# Patient Record
Sex: FEMALE | Race: WHITE | Employment: UNEMPLOYED | URBAN - METROPOLITAN AREA
[De-identification: names, ages, dates, MRNs, and addresses within clinical notes are randomized per-mention and may not be internally consistent; named-entity substitution may affect disease eponyms.]

---

## 2022-01-06 ENCOUNTER — OFFICE VISIT (OUTPATIENT)
Dept: FAMILY MEDICINE CLINIC | Age: 22
End: 2022-01-06
Payer: COMMERCIAL

## 2022-01-06 VITALS
HEIGHT: 66 IN | WEIGHT: 228 LBS | SYSTOLIC BLOOD PRESSURE: 126 MMHG | BODY MASS INDEX: 36.64 KG/M2 | HEART RATE: 85 BPM | DIASTOLIC BLOOD PRESSURE: 78 MMHG

## 2022-01-06 DIAGNOSIS — Z87.768 H/O DEVELOPMENTAL DYSPLASIA OF THE HIP: ICD-10-CM

## 2022-01-06 DIAGNOSIS — N39.0 URINARY TRACT INFECTION WITHOUT HEMATURIA, SITE UNSPECIFIED: Primary | ICD-10-CM

## 2022-01-06 DIAGNOSIS — G89.29 HIP PAIN, CHRONIC, LEFT: ICD-10-CM

## 2022-01-06 DIAGNOSIS — M25.552 HIP PAIN, CHRONIC, LEFT: ICD-10-CM

## 2022-01-06 LAB
BILIRUBIN, POC: NORMAL
BLOOD URINE, POC: NORMAL
CLARITY, POC: CLEAR
COLOR, POC: YELLOW
GLUCOSE URINE, POC: NORMAL
KETONES, POC: NORMAL
LEUKOCYTE EST, POC: NORMAL
NITRITE, POC: NORMAL
PH, POC: 6
PROTEIN, POC: NORMAL
SPECIFIC GRAVITY, POC: 1030
UROBILINOGEN, POC: NORMAL

## 2022-01-06 PROCEDURE — G8427 DOCREV CUR MEDS BY ELIG CLIN: HCPCS | Performed by: NURSE PRACTITIONER

## 2022-01-06 PROCEDURE — G8417 CALC BMI ABV UP PARAM F/U: HCPCS | Performed by: NURSE PRACTITIONER

## 2022-01-06 PROCEDURE — 81003 URINALYSIS AUTO W/O SCOPE: CPT | Performed by: NURSE PRACTITIONER

## 2022-01-06 PROCEDURE — G8484 FLU IMMUNIZE NO ADMIN: HCPCS | Performed by: NURSE PRACTITIONER

## 2022-01-06 PROCEDURE — 99203 OFFICE O/P NEW LOW 30 MIN: CPT | Performed by: NURSE PRACTITIONER

## 2022-01-06 PROCEDURE — 1036F TOBACCO NON-USER: CPT | Performed by: NURSE PRACTITIONER

## 2022-01-06 SDOH — ECONOMIC STABILITY: FOOD INSECURITY: WITHIN THE PAST 12 MONTHS, YOU WORRIED THAT YOUR FOOD WOULD RUN OUT BEFORE YOU GOT MONEY TO BUY MORE.: NEVER TRUE

## 2022-01-06 SDOH — ECONOMIC STABILITY: FOOD INSECURITY: WITHIN THE PAST 12 MONTHS, THE FOOD YOU BOUGHT JUST DIDN'T LAST AND YOU DIDN'T HAVE MONEY TO GET MORE.: NEVER TRUE

## 2022-01-06 ASSESSMENT — PATIENT HEALTH QUESTIONNAIRE - PHQ9
SUM OF ALL RESPONSES TO PHQ QUESTIONS 1-9: 0
SUM OF ALL RESPONSES TO PHQ9 QUESTIONS 1 & 2: 0
SUM OF ALL RESPONSES TO PHQ QUESTIONS 1-9: 0
2. FEELING DOWN, DEPRESSED OR HOPELESS: 0
SUM OF ALL RESPONSES TO PHQ QUESTIONS 1-9: 0
1. LITTLE INTEREST OR PLEASURE IN DOING THINGS: 0
SUM OF ALL RESPONSES TO PHQ QUESTIONS 1-9: 0

## 2022-01-06 ASSESSMENT — SOCIAL DETERMINANTS OF HEALTH (SDOH): HOW HARD IS IT FOR YOU TO PAY FOR THE VERY BASICS LIKE FOOD, HOUSING, MEDICAL CARE, AND HEATING?: NOT HARD AT ALL

## 2022-01-07 DIAGNOSIS — G89.29 HIP PAIN, CHRONIC, LEFT: Primary | ICD-10-CM

## 2022-01-07 DIAGNOSIS — M25.552 HIP PAIN, CHRONIC, LEFT: Primary | ICD-10-CM

## 2022-01-11 ASSESSMENT — ENCOUNTER SYMPTOMS
VOMITING: 0
NAUSEA: 0

## 2022-01-11 NOTE — PROGRESS NOTES
Subjective  Tommye Kehr, 24 y.o. female presents today with:  Chief Complaint   Patient presents with   Murrel Neither Established New Doctor    Hip Pain     left     Urinary Tract Infection        Has a history of hip dysplasia as a child she states she is having chronic pain in her left hip would like it evaluated and would like an Ortho consult    Urinary Tract Infection   This is a new problem. The current episode started in the past 7 days. The problem occurs every urination. The problem has been unchanged. The quality of the pain is described as burning. There has been no fever. She is sexually active. There is no history of pyelonephritis. Pertinent negatives include no chills, discharge, flank pain, frequency, hematuria, hesitancy, nausea, possible pregnancy, sweats, urgency or vomiting. She has tried nothing for the symptoms. The treatment provided no relief. Review of Systems   Constitutional: Negative for chills. Gastrointestinal: Negative for nausea and vomiting. Genitourinary: Negative for flank pain, frequency, hematuria, hesitancy and urgency. No past medical history on file. No past surgical history on file.   Social History     Socioeconomic History    Marital status: Single     Spouse name: Not on file    Number of children: Not on file    Years of education: Not on file    Highest education level: Not on file   Occupational History    Not on file   Tobacco Use    Smoking status: Never Smoker    Smokeless tobacco: Never Used   Vaping Use    Vaping Use: Never used   Substance and Sexual Activity    Alcohol use: Never    Drug use: Never    Sexual activity: Not on file   Other Topics Concern    Not on file   Social History Narrative    Not on file     Social Determinants of Health     Financial Resource Strain: Low Risk     Difficulty of Paying Living Expenses: Not hard at all   Food Insecurity: No Food Insecurity    Worried About [address] [employer] in the Last Year: Never true    Xiomara of Food in the Last Year: Never true   Transportation Needs:     Lack of Transportation (Medical): Not on file    Lack of Transportation (Non-Medical): Not on file   Physical Activity:     Days of Exercise per Week: Not on file    Minutes of Exercise per Session: Not on file   Stress:     Feeling of Stress : Not on file   Social Connections:     Frequency of Communication with Friends and Family: Not on file    Frequency of Social Gatherings with Friends and Family: Not on file    Attends Alevism Services: Not on file    Active Member of [address] [place] [address] [employer] or Organizations: Not on file    Attends Club or Organization Meetings: Not on file    Marital Status: Not on file   Intimate Partner Violence:     Fear of Current or Ex-Partner: Not on file    Emotionally Abused: Not on file    Physically Abused: Not on file    Sexually Abused: Not on file   Housing Stability:     Unable to Pay for Housing in the Last Year: Not on file    Number of [place] in the Last Year: Not on file    Unstable Housing in the Last Year: Not on file     No family history on file. No Known Allergies  No current outpatient medications on file. No current facility-administered medications for this visit. PMH, Surgical Hx, Family Hx, and Social Hx reviewed and updated. Health Maintenance reviewed. Objective    Vitals:    01/06/22 1535   BP: 126/78   Pulse: 85   Weight: 228 lb (103.4 kg)   Height: 5' 6\" (1.676 m)       Physical Exam  Constitutional:       General: She is not in acute distress. Appearance: She is well-developed. HENT:      Head: Normocephalic and atraumatic. Right Ear: External ear normal.      Left Ear: External ear normal.      Nose: Nose normal.   Eyes:      Conjunctiva/sclera: Conjunctivae normal.      Pupils: Pupils are equal, round, and reactive to light. Neck:      Vascular: No JVD. Cardiovascular:      Rate and Rhythm: Normal rate and regular rhythm.       Heart sounds: Normal heart sounds. No murmur heard. Pulmonary:      Effort: Pulmonary effort is normal. No respiratory distress. Breath sounds: Normal breath sounds. No wheezing or rales. Abdominal:      General: Bowel sounds are normal. There is no distension. Palpations: Abdomen is soft. There is no mass. Tenderness: There is no abdominal tenderness. Musculoskeletal:         General: Tenderness present. No swelling, deformity or signs of injury. Cervical back: Neck supple. Right lower leg: No edema. Left lower leg: No edema. Skin:     General: Skin is warm and dry. Neurological:      Mental Status: She is alert and oriented to person, place, and time. Assessment & Plan   Oh Griffin was seen today for established new doctor, hip pain and urinary tract infection. Diagnoses and all orders for this visit:    Urinary tract infection without hematuria, site unspecified  -     POCT Urinalysis No Micro (Auto)    H/O developmental dysplasia of the hip  -     XR HIP LEFT (2-3 VIEWS); Future  -     Funmilayo Herring MD, Orthopaedic Surgery [facility]    Hip pain, chronic, left  -     XR HIP LEFT (2-3 VIEWS); Future  -     Funmilayo Herring MD, Orthopaedic Surgery [facility]      Orders Placed This Encounter   Procedures    XR HIP LEFT (2-3 VIEWS)     Standing Status:   Future     Number of Occurrences:   1     Standing Expiration Date:   1/6/2023   Funmilayo Herring MD, Orthopaedic Surgery [facility]     Referral Priority:   Routine     Referral Type:   Eval and Treat     Referral Reason:   Specialty Services Required     Referred to Provider:   Brandon Lara MD     Requested Specialty:   Orthopedic Surgery     Number of Visits Requested:   1    POCT Urinalysis No Micro (Auto)     No orders of the defined types were placed in this encounter. There are no discontinued medications. No follow-ups on file.       Reviewed with the patient: current clinical status, medications, activities and diet. Side effects, adverse effects of the medication prescribed today, as well as treatment plan/ rationale and result expectations have been discussed with the patient who expresses understanding and desires to proceed. Close follow up to evaluate treatment results and for coordination of care. I have reviewed the patient's medical history in detail and updated the computerized patient record.     Taras Soriano, APRN - CNP

## 2022-01-12 ENCOUNTER — TELEPHONE (OUTPATIENT)
Dept: FAMILY MEDICINE CLINIC | Age: 22
End: 2022-01-12

## 2022-01-12 NOTE — TELEPHONE ENCOUNTER
----- Message from Gia Sampson sent at 1/12/2022  2:05 PM EST -----  Subject: Message to Provider    QUESTIONS  Information for Provider? Patient would like to know why Physical Therapy   keeps calling her? I'm assuming she is unaware of Dr Moni Gamboa   referring her to PT. Please call patient back with information, mom called   for her but is not on HIPPA   ---------------------------------------------------------------------------  --------------  CALL BACK INFO  What is the best way for the office to contact you? OK to leave message on   voicemail  Preferred Call Back Phone Number? [phone]  ---------------------------------------------------------------------------  --------------  SCRIPT ANSWERS  Relationship to Patient?  Self

## 2022-01-13 NOTE — TELEPHONE ENCOUNTER
For her left hip pain the x-ray was normal and she says has been going on for months please call and let her know  why physical therapy is calling her

## 2022-01-17 ENCOUNTER — TELEPHONE (OUTPATIENT)
Dept: FAMILY MEDICINE CLINIC | Age: 22
End: 2022-01-17

## 2022-01-17 NOTE — TELEPHONE ENCOUNTER
----- Message from Yudelka Golden sent at 1/11/2022  4:25 PM EST -----  Subject: Message to Provider    QUESTIONS  Information for Provider? Pt is returning a missed results call from   [facility], pls give her a call.  ---------------------------------------------------------------------------  --------------  CALL BACK INFO  What is the best way for the office to contact you? OK to leave message on   voicemail  Preferred Call Back Phone Number?  [phone]  ---------------------------------------------------------------------------  --------------  SCRIPT ANSWERS  undefined

## 2022-01-18 ENCOUNTER — HOSPITAL ENCOUNTER (OUTPATIENT)
Dept: PHYSICAL THERAPY | Age: 22
Setting detail: THERAPIES SERIES
Discharge: HOME | End: 2022-01-18
Payer: COMMERCIAL

## 2022-01-18 PROCEDURE — 97110 THERAPEUTIC EXERCISES: CPT

## 2022-01-18 PROCEDURE — 97161 PT EVAL LOW COMPLEX 20 MIN: CPT

## 2022-01-18 ASSESSMENT — PAIN DESCRIPTION - LOCATION: LOCATION: HIP

## 2022-01-18 ASSESSMENT — PAIN DESCRIPTION - DIRECTION: RADIATING_TOWARDS: DENIES

## 2022-01-18 ASSESSMENT — PAIN DESCRIPTION - FREQUENCY: FREQUENCY: INTERMITTENT

## 2022-01-18 ASSESSMENT — PAIN SCALES - GENERAL: PAINLEVEL_OUTOF10: 0

## 2022-01-18 ASSESSMENT — PAIN DESCRIPTION - ORIENTATION: ORIENTATION: ANTERIOR;LEFT

## 2022-01-18 NOTE — PROGRESS NOTES
[address]  PHYSICAL THERAPY EVALUATION    Date: 2022  Patient Name: Kemal Blackburn       MRN: [id number]   Account: [de-identified]   : 2000  (24 y.o.)   Gender: female   Referring Practitioner: Martha PACHECO                 Diagnosis: hip pain, chronic left  Treatment Diagnosis: decreased hip ROM, decreased hip strength, decreased activity tolerance for running and amb prolonged distances >1 mile and increased pain L anterior hip with transferring off the floor  Additional Pertinent Hx: hip dysplasia surgery in         Past Medical History:  has no past medical history on file. Past Surgical History:   has no past surgical history on file. Vital Signs  Patient Currently in Pain: Denies   Pain Screening  Patient Currently in Pain: Denies  Pain Assessment  Pain Assessment: 0-10  Pain Level: 0 (pain range 0-8/10)  Pain Location: Hip (ASIS)  Pain Orientation: Anterior; Left  Pain Radiating Towards: denies  Pain Descriptors:  (\"tearing\")  Pain Frequency: Intermittent     Lives With: Family  Active : Yes  Occupation: Student  Type of occupation: [employer]     Subjective:  Subjective: Pt to PT due to continued L hip pain- pain increases when getting up from the ground, walking 5K, and running for 1 mile- pain lasts for 5 min after activity. Pain does not wake pt at night. Pt reports no use of pain meds. Pt states some decreased pain with stretching (lunging) before physical activity. Comments: RTD 22; L hip x-ray 2022: Bony pelvis intact without acute fracture. No hip fracture. Hip joint spaces maintained. SI joints maintained. Pubic symphysis is intact. Visualized lower lumbar spine unremarkable. Soft tissues unremarkable. No other significant abnormality.     Objective:   Sensation  Overall Sensation Status: WFL    Ambulation 1  Surface: carpet  Device: No Device  Assistance: Independent  Quality of Gait: toe in on right  Stairs  # Steps : 4  Device: No Device  Assistance: Independent  Comment: no deficits with stair negotiation    Strength RLE  Strength RLE: WNL  Strength LLE  Strength LLE: Exception  L Hip Flexion: 4/5 (pain provocation)  L Hip Extension: 4+/5  L Hip ABduction: 4/5  L Hip ADduction: 5/5  L Hip Internal Rotation: 4/5  L Hip External Rotation: 4/5  L Knee Flexion: 5/5  L Knee Extension: 5/5  L Ankle Dorsiflexion: 5/5     AROM RLE (degrees)  RLE AROM: Exceptions  RLE General AROM: knee/ ankle WNL  R Hip Flexion 0-125: 90  R Hip Extension 0-10: 10  R Hip ABduction 0-45: 45  R Hip ADduction 0-10: 10  R Hip External Rotation 0-45: 35  R Hip Internal Rotation 0-45: 35     AROM LLE (degrees)  LLE AROM : Exceptions  LLE General AROM: knee/ ankle WNL  L Hip Flexion 0-125: 80  L Hip Extension 0-10: 10  L Hip ABduction 0-45: 35  L Hip ADduction 0-10: 10  L Hip External Rotation 0-45: 35  L Hip Internal Rotation 0-45: 40     Observation/Palpation  Posture: Good  Palpation: no pain with palpation  Observation: no difficulty or pain transferring off the ground today    Additional Measures  Flexibility: hamstring flexibility 90/90 R/L: -30 deg  Special Tests: L Hip: SCOUR(-), FADIR(-), YASMEEN(-), LUZMARIA's(-), GRACE(+)    Exercises:   Exercises  Exercise 1: isometric psoas 10 sec X 10  Exercise 2: half kneeling hip flexor stretch 10 sec X 3 R/L  Exercise 3: prone bend knee fallout*  Exercise 4: seated hip IR/ER with TB/ progress to SLS*  Exercise 5: s/l hip abd/ clam/ reverse clam*  Exercise 6: frogger walk*  Exercise 7: hamstring stretch*  Exercise 8: hip ER stretch*  Exercise 9: bridge*  Exercise 10: isometric hip flexion*  Exercise 11: DKTC*  Exercise 20: HEP: isometric psoas, half kneel lunge, use of ice or heat daily, self massage L hip flexor daily     Modalities:  Modalities  Moist heat: *  Cryotherapy (Minutes\Location):  *     Manual:  Manual therapy  PROM: *  Soft Tissue Mobalization: *  *Indicates exercise,modality, or manual techniques to be initiated when appropriate    Assessment: Body structures, Functions, Activity limitations: Decreased functional mobility ,Decreased endurance,Decreased ROM,Increased pain,Decreased strength  Assessment: Pt is 24 y.o. female to PT due to chronic L anterior hip pain- pain is only with certain movements. Pt with history of L hip dysplagia surgery in 2000. Pt exhibits impairments including decreased hip ROM, decreased hip strength, decreased activity tolerance for running and amb prolonged distances >1 mile and increased pain L anterior hip with transferring off the floor. Pt requires continued PT to address these impairments, progress strength and ROM, and provide pt with HEP for self management of pain. Prognosis: Good  Discharge Recommendations: Continue to assess pending progress     Decision Making: Low Complexity  History: low- hip dysplasia surgery in 2000  Exam: med- LEFS 63/80  Clinical Presentation: stable- low    Plan  Frequency/Duration:  Plan  Times per week: 1  Plan weeks: 4-6  Current Treatment Recommendations: Strengthening,Neuromuscular Re-education,Home Exercise Program,ROM,Integrated Dry Needling,Manual Therapy - Soft Tissue Mobilization,Safety Education & Training,Endurance Training,Patient/Caregiver Education & Training,Functional Mobility Training,Equipment Evaluation, Education, & procurement,Modalities,Pain [address]    Patient Education  New Education Provided: PT Education: Goals;PT Role;Plan of Care;Home Exercise Program    POST-PAIN     Pain Rating (0-10 pain scale):   0/10  Location and pain description same as pre-treatment unless indicated. Action: [x] NA  [] Call Physician  [] Perform HEP  [] Meds as prescribed    Evaluation and patient rights have been reviewed and patient agrees with plan of care.   Yes  [x]  No  []   Explain:       Isamar Fall Risk Assessment  Risk Factor Scale  Score   History of Falls [] Yes  [x] No 25  0 0   Secondary Diagnosis [] Yes  [x] No 15  0 0   Ambulatory Aid [] Furniture  [] Crutches/cane/walker  [x] None/bedrest/wheelchair/nurse 30  15  0 0   IV/Heparin Lock [] Yes  [x] No 20  0 0   Gait/Transferring [] Impaired  [] Weak  [x] Normal/bedrest/immobile 20  10  0 0   Mental Status [] Forgets limitations  [x] Oriented to own ability 15  0 0      Total:0     Based on the Assessment score: check the appropriate box. [x]  No intervention needed   Low =   Score of 0-24  []  Use standard prevention interventions Moderate =  Score of 24-44   [] Discuss fall prevention strategies   [] Indicate moderate falls risk on eval  []  Use high risk prevention interventions High = Score of 45 and higher   [] Discuss fall prevention strategies   [] Provide supervision during treatment time    Goals  Short term goals  Time Frame for Short term goals: 2 wks  Short term goal 1: Pt will demonstrate improved L hip AROM WNL for carryover to decreased anterior hip pain. Long term goals  Time Frame for Long term goals : 6 wks  Long term goal 1: Pt will demonstrate indep and compliance with % of the time for self management of L hip pain. Long term goal 2: Pt will demonstrate improved score on LEFS >/= 75/80 for improved pt quality of life. Long term goal 3: The pt will report decreased pain </=1/10 at worst in order to increase ease with running and amb > 1 mile and decresed pain with transfers off the floor.     PT Individual Minutes  Time In: 0806  Time Out: 2811  Minutes: 49  Timed Code Treatment Minutes: 0 Minutes  Procedure Minutes: eval X  49 min     Electronically signed by Umer Valverde PT on 1/18/22 at 12:38 PM EST

## 2022-01-18 NOTE — PROGRESS NOTES
[address]     Ph: [phone]  Fax: [phone]    [] Certification  [] Recertification [x]  Plan of Care  [] Progress Note [] Discharge      To:  Surya Sequeiraming APRN-CNP      From:  Clover Sprague, PT  Patient: Calvin Keys     : 2000  Diagnosis: hip pain, chronic left     Date: 2022  Treatment Diagnosis: decreased hip ROM, decreased hip strength, decreased activity tolerance for running and amb prolonged distances >1 mile and increased pain L anterior hip with transferring off the floor       Progress Report Period from:  2022  to 2022    Total # of Visits to Date: 1   No Show: 0    Canceled Appointment: 0     OBJECTIVE:   Short Term Goals - Time Frame for Short term goals: 2 wks    Goals Current/Discharge status  Met   Short term goal 1: Pt will demonstrate improved L hip AROM WNL for carryover to decreased anterior hip pain. AROM RLE (degrees)  RLE AROM: Exceptions  RLE General AROM: knee/ ankle WNL  R Hip Flexion 0-125: 90  R Hip Extension 0-10: 10  R Hip ABduction 0-45: 45  R Hip ADduction 0-10: 10  R Hip External Rotation 0-45: 35  R Hip Internal Rotation 0-45: 35     AROM LLE (degrees)  LLE AROM : Exceptions  LLE General AROM: knee/ ankle WNL  L Hip Flexion 0-125: 80  L Hip Extension 0-10: 10  L Hip ABduction 0-45: 35  L Hip ADduction 0-10: 10  L Hip External Rotation 0-45: 35  L Hip Internal Rotation 0-45: 40     [] yes  [x] no     Long Term Goals - Time Frame for Long term goals : 6 wks  Goals Current/ Discharge status Met   Long term goal 1: Pt will demonstrate indep and compliance with % of the time for self management of L hip pain. HEP initiated [] yes  [x] no   Long term goal 2: Pt will demonstrate improved score on LEFS >/= 75/80 for improved pt quality of life.  LEFS 63/80   [] yes  [x] no   Long term goal 3: The pt will report decreased pain </=1/10 at worst in order to increase ease with running and amb > 1 mile and decresed pain with transfers off the floor. Pain Location: Hip (ASIS)    Pain Level: 0 (pain range 0-8/10)    Pain Descriptors:  (\"tearing\")   [] yes  [x] no     Body structures, Functions, Activity limitations: Decreased functional mobility ,Decreased endurance,Decreased ROM,Increased pain,Decreased strength  Assessment: Pt is 24 y.o. female to PT due to chronic L anterior hip pain- pain is only with certain movements. Pt with history of L hip dysplagia surgery in 2000. Pt exhibits impairments including decreased hip ROM, decreased hip strength, decreased activity tolerance for running and amb prolonged distances >1 mile and increased pain L anterior hip with transferring off the floor. Pt requires continued PT to address these impairments, progress strength and ROM, and provide pt with HEP for self management of pain. Prognosis: Good  Discharge Recommendations: Continue to assess pending progress      PT Education: Goals;PT Role;Plan of Care;Home Exercise Program    PLAN: [x] Evaluate and Treat  Frequency/Duration:  Plan  Times per week: 1  Plan weeks: 4-6  Current Treatment Recommendations: Strengthening,Neuromuscular Re-education,Home Exercise Program,ROM,Integrated Dry Needling,Manual Therapy - Soft Tissue Mobilization,Safety Education & Training,Endurance Training,Patient/Caregiver Education & Training,Functional Mobility Training,Equipment Evaluation, Education, & procurement,Modalities,Pain [address] [facility]                  Patient Status:[x] Continue/ Initiate plan of Care    [] Discharge PT. Recommend pt continue with HEP. [] Additional visits requested, Please re-certify for additional visits:          Signature: Electronically signed by Era Hernandez PT on 1/18/22 at 12:40 PM EST      If you have any questions or concerns, please don't hesitate to call.   Thank you for your referral.    I have reviewed this plan of care and certify a need for medically necessary rehabilitation services.     Physician Signature:__________________________________________________________  Date:  Please sign and return

## 2022-01-24 ENCOUNTER — HOSPITAL ENCOUNTER (OUTPATIENT)
Dept: PHYSICAL THERAPY | Age: 22
Setting detail: THERAPIES SERIES
Discharge: HOME | End: 2022-01-24
Payer: COMMERCIAL

## 2022-01-24 PROCEDURE — 97110 THERAPEUTIC EXERCISES: CPT

## 2022-01-24 PROCEDURE — 97140 MANUAL THERAPY 1/> REGIONS: CPT

## 2022-01-24 ASSESSMENT — PAIN SCALES - GENERAL: PAINLEVEL_OUTOF10: 0

## 2022-01-24 NOTE — PROGRESS NOTES
[facility]   Outpatient Physical Therapy   Treatment Note  [] [address]  [x] [facility]  Date: 2022  Patient: Dipti Lomas  : 2000  ACCT #: [de-identified]  Referring Practitioner: Chris PACHECO  Diagnosis: hip pain, chronic left        Visit Information:  PT Visit Information  PT Insurance Information: [employer]  Total # of Visits Approved: 8 (eval only)  Total # of Visits to Date: 2  No Show: 0  Canceled Appointment: 0  Progress Note Counter:  (24 units through 3/10/22)    SUBJECTIVE:   Subjective  Subjective: Pt reports that hip is feelin looser. HEP Compliance:  [x] Good [] Fair [] Poor [] Reports not doing due to:    PAIN   Location:      Pain Rating (0-10 pain scale):  Pain Level: 0  Pain Description:     Action:  [] Acceptable for treatment  []  Other:    OBJECTIVE:   Exercises  Exercise 1: isometric psoas 10 sec X 10  Exercise 2: half kneeling hip flexor stretch 10 sec X 3 R/L  Exercise 5:  clam/ reverse clam x1  Exercise 7: hamstring stretch step   Exercise 8: hip ER stretch [id number]  Exercise 9: bridge x10  Exercise 11: sKTC 3 x30 sec   Exercise 20: HEP clamshells sktc    Manual: []  NA   Manual therapy  Soft Tissue Mobalization: tennis ball massage hip flexor      HEP  Continue with current Home Exercise Program.  See Objective section for progression of HEP. Comments:       POST-PAIN    Pain Rating (0-10 pain scale): 0/10  Location and Pain Description same as pre-pain unless otherwise indicated. Action: [] NA  [] Call Physician  [x] Perform HEP  [] Meds as prescribed     ASSESSMENT:     Conditions Requiring Skilled Therapeutic Intervention  Body structures, Functions, Activity limitations: Decreased functional mobility ,Decreased endurance,Decreased ROM,Increased pain,Decreased strength  Assessment: Pt with some increased difficulty with the clamshells. Pt with overall good alberto to stretches. Tolerance to treatment:  [x] Good   [] Fair   [] Poor  [] Fatigued   [] Increased pain   Limited by:    Goals:     Short term goals  Time Frame for Short term goals: 2 wks  Short term goal 1: Pt will demonstrate improved L hip AROM WNL for carryover to decreased anterior hip pain. Long term goals  Time Frame for Long term goals : 6 wks  Long term goal 1: Pt will demonstrate indep and compliance with % of the time for self management of L hip pain. Long term goal 2: Pt will demonstrate improved score on LEFS >/= 75/80 for improved pt quality of life. Long term goal 3: The pt will report decreased pain </=1/10 at worst in order to increase ease with running and amb > 1 mile and decresed pain with transfers off the floor. Progress toward goals:tbd  Goals Met:    []  See updated POC   Comments:    PLAN:  [x] Continue POC to pt tolerance  [] Hold PT for ___ days.   See note to physician  [] Discharge PT    Signature:   Electronically signed by Yimi Kay PTA on 1/24/22 at 5:21 PM EST    PT Individual Minutes  Time In: 9722  Time Out: 6204  Minutes: 42  Timed Code Treatment Minutes: 42 Minutes    Activity Minutes Units   Ther Ex 32 2   Manual   10 1

## 2022-02-03 ENCOUNTER — HOSPITAL ENCOUNTER (OUTPATIENT)
Dept: PHYSICAL THERAPY | Age: 22
Setting detail: THERAPIES SERIES
Discharge: HOME | End: 2022-02-03
Payer: COMMERCIAL

## 2022-02-03 NOTE — PROGRESS NOTES
[address]       Physical Therapy  Cancellation/No-show Note  Patient Name:  Carisa Hunter  :  2000   Date:  2/3/2022  Referring Practitioner: Tierra PACHECO  Diagnosis: hip pain, chronic left    Visit Information:  PT Visit Information  PT Insurance Information: [employer]  Total # of Visits Approved: 8 (eval only)  Total # of Visits to Date: 2  No Show: 0  Canceled Appointment: 1  Progress Note Counter:  (24 units through 3/10/22) Cx 2/3/22    For today's appointment patient:  [x]  Cancelled  []  Rescheduled appointment  []  No-show   []  Called pt to remind of next appointment     Reason given by patient:  []  Patient ill  []  Conflicting appointment  []  No transportation    []  Conflict with work  []  No reason given  [x]  Other:   weather     Comments:       Signature: Electronically signed by Felipe Romero PTA on 2/3/22 at 10:38 AM EST

## 2022-02-07 ENCOUNTER — HOSPITAL ENCOUNTER (OUTPATIENT)
Dept: PHYSICAL THERAPY | Age: 22
Setting detail: THERAPIES SERIES
Discharge: HOME | End: 2022-02-07
Payer: COMMERCIAL

## 2022-02-07 PROCEDURE — 97110 THERAPEUTIC EXERCISES: CPT

## 2022-02-07 PROCEDURE — 97140 MANUAL THERAPY 1/> REGIONS: CPT

## 2022-02-07 ASSESSMENT — PAIN SCALES - GENERAL: PAINLEVEL_OUTOF10: 0

## 2022-02-07 NOTE — PROGRESS NOTES
[facility]   Outpatient Physical Therapy   Treatment Note  [] [address]  [x] [facility] CENTER            of [address]  Date: 2022  Patient: Dipti Lomas  : 2000  ACCT #: [de-identified]  Referring Practitioner: Chris PACHECO  Diagnosis: hip pain, chronic left  Treatment Diagnosis: decreased hip ROM, decreased hip strength, decreased activity tolerance for running and amb prolonged distances >1 mile and increased pain L anterior hip with transferring off the floor     Visit Information:  PT Visit Information  PT Insurance Information: [employer]  Total # of Visits Approved: 8 (eval only)  Total # of Visits to Date: 3  No Show: 0  Canceled Appointment: 1  Progress Note Counter: 3/7 (24 units through 3/10/22) Cx 2/3/22    SUBJECTIVE:   Subjective  Subjective: Pt reports that hip as been feeling pretty good. HEP Compliance:  [x] Good [] Fair [] Poor [] Reports not doing due to:    PAIN   Location:      Pain Rating (0-10 pain scale):  Pain Level: 0  Pain Description:     Action:  [] Acceptable for treatment  []  Other:    OBJECTIVE:   Exercises  Exercise 4: seated hip IR/ER with YTB   Exercise 5:  clam/ reverse clam x15  Exercise 6: piriformis stretch 3x30 sec   Exercise 7: hamstring stretch step   Exercise 8: hip ER stretch [id number]  Exercise 9: bridge x10  Exercise 11: sKTC 3 x30 sec   Exercise 20: HEP hamstring stretch standing    Manual: []  NA   Manual therapy  PROM: PROM chavez hips  Soft Tissue Mobalization: getly lt le leg pulls     HEP  Continue with current Home Exercise Program.  See Objective section for progression of HEP. Comments:       POST-PAIN    Pain Rating (0-10 pain scale): 0/10  Location and Pain Description same as pre-pain unless otherwise indicated.   Action: [] NA  [] Call Physician  [x] Perform HEP  [] Meds as prescribed     ASSESSMENT:     Conditions Requiring Skilled Therapeutic Intervention  Body structures, Functions, Activity limitations: Decreased functional mobility ,Decreased endurance,Decreased ROM,Increased pain,Decreased strength  Assessment: Pt with some increased pain when coming out of the piriformis stretch. Pt with increased difficulty with hip internal rotation. Treatment Diagnosis: decreased hip ROM, decreased hip strength, decreased activity tolerance for running and amb prolonged distances >1 mile and increased pain L anterior hip with transferring off the floor        Tolerance to treatment:  [x] Good   [] Fair   [] Poor  [] Fatigued   [] Increased pain   Limited by:    Goals:     Short term goals  Time Frame for Short term goals: 2 wks  Short term goal 1: Pt will demonstrate improved L hip AROM WNL for carryover to decreased anterior hip pain. Long term goals  Time Frame for Long term goals : 6 wks  Long term goal 1: Pt will demonstrate indep and compliance with % of the time for self management of L hip pain. Long term goal 2: Pt will demonstrate improved score on LEFS >/= 75/80 for improved pt quality of life. Long term goal 3: The pt will report decreased pain </=1/10 at worst in order to increase ease with running and amb > 1 mile and decresed pain with transfers off the floor. Progress toward goals: rom  Goals Met:    []  See updated POC   Comments:    PLAN:  [x] Continue POC to pt tolerance  [] Hold PT for ___ days.   See note to physician  [] Discharge PT    Signature:   Electronically signed by Tay Barron PTA on 2/7/22 at 5:10 PM EST    PT Individual Minutes  Time In: 5391  Time Out: 0354  Minutes: 41  Timed Code Treatment Minutes: 41 Minutes    Activity Minutes Units   Ther Ex 31 2   Manual   10  1

## 2022-02-14 ENCOUNTER — HOSPITAL ENCOUNTER (OUTPATIENT)
Dept: PHYSICAL THERAPY | Age: 22
Setting detail: THERAPIES SERIES
Discharge: HOME | End: 2022-02-14
Payer: COMMERCIAL

## 2022-02-14 PROCEDURE — 97110 THERAPEUTIC EXERCISES: CPT

## 2022-02-14 PROCEDURE — 97140 MANUAL THERAPY 1/> REGIONS: CPT

## 2022-02-14 ASSESSMENT — PAIN SCALES - GENERAL: PAINLEVEL_OUTOF10: 0

## 2022-02-14 NOTE — PROGRESS NOTES
[facility]   Outpatient Physical Therapy   Treatment Note  [] [address]  [x] [facility]  Date: 2022  Patient: Guanako Sutherland  : 2000  ACCT #: [de-identified]  Referring Practitioner: Yaneth PACHECO  Diagnosis: hip pain, chronic left        Visit Information:  PT Visit Information  PT Insurance Information: [facility]  Total # of Visits Approved: 8 (eval only)  Total # of Visits to Date: 4  No Show: 0  Canceled Appointment: 1  Progress Note Counter:  (24 units through 3/10/22     SUBJECTIVE:   Subjective  Subjective: pt reports that she felt good after last visit. HEP Compliance:  [x] Good [] Fair [] Poor [] Reports not doing due to:    PAIN   Location:      Pain Rating (0-10 pain scale):  Pain Level: 0  Pain Description:     Action:  [] Acceptable for treatment  []  Other:    OBJECTIVE:   Exercises  Exercise 5:  clam/ reverse clam x15  Exercise 6: piriformis stretch 3x30 sec   Exercise 7: hamstring stretch step   Exercise 9: bridge x10  Exercise 11: sKTC 3 x30 sec     Manual: []  NA   Manual therapy  PROM: PROM LT hip      HEP  Continue with current Home Exercise Program.  See Objective section for progression of HEP. Comments:       POST-PAIN    Pain Rating (0-10 pain scale): 0/10  Location and Pain Description same as pre-pain unless otherwise indicated. Action: [] NA  [] Call Physician  [x] Perform HEP  [] Meds as prescribed     ASSESSMENT:     Conditions Requiring Skilled Therapeutic Intervention  Body structures, Functions, Activity limitations: Decreased functional mobility ,Decreased endurance,Decreased ROM,Increased pain,Decreased strength  Assessment: Pt with increased alberto to piriformis stretch this date.          Tolerance to treatment:  [x] Good   [] Fair   [] Poor  [] Fatigued   [] Increased pain   Limited by:    Goals:     Short term goals  Time Frame for Short term goals: 2 wks  Short term goal 1: Pt will demonstrate improved L hip AROM WNL for carryover to decreased anterior hip pain. Long term goals  Time Frame for Long term goals : 6 wks  Long term goal 1: Pt will demonstrate indep and compliance with % of the time for self management of L hip pain. Long term goal 2: Pt will demonstrate improved score on LEFS >/= 75/80 for improved pt quality of life. Long term goal 3: The pt will report decreased pain </=1/10 at worst in order to increase ease with running and amb > 1 mile and decresed pain with transfers off the floor. Progress toward goals: rom   Goals Met:    []  See updated POC   Comments:    PLAN:  [x] Continue POC to pt tolerance  [] Hold PT for ___ days.   See note to physician  [] Discharge PT    Signature:   Electronically signed by Genaro Golden PTA on 2/14/22 at 5:37 PM EST    PT Individual Minutes  Time In: 6226  Time Out: 1640  Minutes: 38  Timed Code Treatment Minutes: 38 Minutes    Activity Minutes Units   Ther Ex 28 2   Manual   10  1

## 2022-02-24 ENCOUNTER — HOSPITAL ENCOUNTER (OUTPATIENT)
Dept: PHYSICAL THERAPY | Age: 22
Setting detail: THERAPIES SERIES
Discharge: HOME | End: 2022-02-24
Payer: COMMERCIAL

## 2022-02-28 ENCOUNTER — HOSPITAL ENCOUNTER (OUTPATIENT)
Dept: PHYSICAL THERAPY | Age: 22
Setting detail: THERAPIES SERIES
Discharge: HOME | End: 2022-02-28
Payer: COMMERCIAL

## 2022-02-28 PROCEDURE — 97140 MANUAL THERAPY 1/> REGIONS: CPT

## 2022-02-28 PROCEDURE — 97110 THERAPEUTIC EXERCISES: CPT

## 2022-02-28 ASSESSMENT — PAIN SCALES - GENERAL: PAINLEVEL_OUTOF10: 0

## 2022-02-28 NOTE — PROGRESS NOTES
[facility]   Outpatient Physical Therapy   Treatment Note  [] [address]  [x] [facility] CENTER            of [address]  Date: 2022  Patient: Blanco Henry  : 2000  ACCT #: [de-identified]  Referring Practitioner: Gerald PACHECO  Diagnosis: hip pain, chronic left        Visit Information:  PT Visit Information  PT Insurance Information: [employer]  Total # of Visits Approved: 8 (eval only)  Total # of Visits to Date: 5  Plan of Care/Certification Expiration Date: 03/10/22  No Show: 0  Canceled Appointment: 2  Progress Note Counter:  (24 units through 3/10/22     SUBJECTIVE:   Subjective  Subjective: pt reports that she hasnt had any pain since last visit. HEP Compliance:  [x] Good [] Fair [] Poor [] Reports not doing due to:    PAIN   Location:      Pain Rating (0-10 pain scale):  Pain Level: 0  Pain Description:     Action:  [] Acceptable for treatment  []  Other:    OBJECTIVE:   Exercises  Exercise 3: SLR 2x10  Exercise 5:  clam/ reverse clam x15  Exercise 6: piriformis stretch 3x30 sec   Exercise 7: hamstring stretch step   Exercise 9: bridge x10  Exercise 11: sKTC 3 x30 sec   Exercise 13: monster walks rtb and lateral walks 2 laps. Manual: []  NA   Manual therapy  PROM: PROM LT hip  Soft Tissue Mobalization: gentle lt le leg pulls       HEP  Continue with current Home Exercise Program.  See Objective section for progression of HEP. Comments:       POST-PAIN    Pain Rating (0-10 pain scale): 0/10  Location and Pain Description same as pre-pain unless otherwise indicated.   Action: [] NA  [] Call Physician  [x] Perform HEP  [] Meds as prescribed     ASSESSMENT:     Conditions Requiring Skilled Therapeutic Intervention  Body structures, Functions, Activity limitations: Decreased functional mobility ,Decreased endurance,Decreased ROM,Increased pain,Decreased strength  Assessment: Initiaited strengthening exercises to help decrease pain this date. Pt with fatigue but good alberto. Tolerance to treatment:  [x] Good   [] Fair   [] Poor  [] Fatigued   [] Increased pain   Limited by:    Goals:     Short term goals  Time Frame for Short term goals: 2 wks  Short term goal 1: Pt will demonstrate improved L hip AROM WNL for carryover to decreased anterior hip pain. Long term goals  Time Frame for Long term goals : 6 wks  Long term goal 1: Pt will demonstrate indep and compliance with % of the time for self management of L hip pain. Long term goal 2: Pt will demonstrate improved score on LEFS >/= 75/80 for improved pt quality of life. Long term goal 3: The pt will report decreased pain </=1/10 at worst in order to increase ease with running and amb > 1 mile and decresed pain with transfers off the floor. Progress toward goals: pain  Goals Met:    []  See updated POC   Comments:    PLAN:  [x] Continue POC to pt tolerance  [] Hold PT for ___ days.   See note to physician  [] Discharge PT    Signature:   Electronically signed by Trae Mobley PTA on 2/28/22 at 5:29 PM EST    PT Individual Minutes  Time In: 1600  Time Out: 1640  Minutes: 40  Timed Code Treatment Minutes: 40 Minutes    Activity Minutes Units   Ther Ex 30 2   Manual   10  1

## 2022-03-07 ENCOUNTER — HOSPITAL ENCOUNTER (OUTPATIENT)
Dept: PHYSICAL THERAPY | Age: 22
Setting detail: THERAPIES SERIES
End: 2022-03-07
Payer: COMMERCIAL

## 2022-03-14 ENCOUNTER — HOSPITAL ENCOUNTER (OUTPATIENT)
Dept: PHYSICAL THERAPY | Age: 22
Setting detail: THERAPIES SERIES
Discharge: HOME | End: 2022-03-14
Payer: COMMERCIAL

## 2022-03-14 NOTE — PROGRESS NOTES
Therapy                            Cancellation/No-show Note      Date:  3/14/2022  Patient Name:  Eren Perla  :  2000   MRN:  [id number]  Referring Practitioner: Anthony PACHECO  Diagnosis: hip pain, chronic left    Visit Information:  PT Visit Information  PT Insurance Information: [employer]  Total # of Visits Approved: 8 (eval only)  Total # of Visits to Date: 5  Plan of Care/Certification Expiration Date: 03/10/22  No Show: 0  Canceled Appointment: 3  Progress Note Counter:  (24 units through 3/10/22 ) Cx 3/14    For today's appointment patient:  [x]  Cancelled  []  Rescheduled appointment  []  No-show   []  Called pt to remind of next appointment     Reason given by patient:  []  Patient ill  []  Conflicting appointment  []  No transportation    [x]  Conflict with work  []  No reason given  []  Other:      [] Pt has future appointments scheduled, no follow up needed  [] Pt requests to be on hold.     Reason:   If > 2 weeks please discuss with therapist.  [] Therapist to call pt for follow up     Comments:       Signature: Electronically signed by Lora Jiménez PTA on 3/14/22 at 2:34 PM EDT

## 2022-03-17 ENCOUNTER — HOSPITAL ENCOUNTER (OUTPATIENT)
Dept: PHYSICAL THERAPY | Age: 22
Setting detail: THERAPIES SERIES
Discharge: HOME | End: 2022-03-17
Payer: COMMERCIAL

## 2022-03-17 PROCEDURE — 97110 THERAPEUTIC EXERCISES: CPT

## 2022-03-17 ASSESSMENT — PAIN SCALES - GENERAL: PAINLEVEL_OUTOF10: 0

## 2022-03-17 NOTE — PROGRESS NOTES
[facility]   Outpatient Physical Therapy   Treatment Note  [] [facility]  [x] [facility] CENTER            of [address]  Date: 3/17/2022  Patient: Mary Kay Lemus  : 2000  ACCT #: [de-identified]  Referring Practitioner: Richard THORNTON-CNP  Diagnosis: hip pain, chronic left        Visit Information:  PT Visit Information  PT Insurance Information: [employer]  Total # of Visits Approved: 8 (eval only)  Total # of Visits to Date: 6  Plan of Care/Certification Expiration Date: 03/10/22  No Show: 0  Canceled Appointment: 3  Progress Note Counter:  (24 units approved through 3/10/22)    SUBJECTIVE:   Subjective  Subjective: Emiliano Owens states she is having less pain in her left hip overall. States her pain is more in her low back right side >left, relieved with sit or laying down. HEP Compliance:  [x] Good [] Fair [] Poor [] Reports not doing due to:    PAIN   Location:      Pain Rating (0-10 pain scale):  Pain Level: 0  Pain Description:     Action:  [] Acceptable for treatment  []  Other:    OBJECTIVE:   Exercises  Exercise 2: hip flexor stretch on step x30 x3  Exercise 3: SLR 2x10  Exercise 4: seated hip IR/ER with YTB (rtb unable to tolerate)  Exercise 5: clam/ reverse clam x15x2  Exercise 6: piriformis stretch 3x30 sec   Exercise 7: hamstring stretch step 30s x3  Exercise 8: hip ER stretch [id number]  Exercise 9: bridge x10, bridge with heel raise to increase glut engagement x10x2  Exercise 11:  sKTC 3 x30 sec   Exercise 13: monster walks rtb and lateral walks 15 feet x4  Exercise 20: HEP hamstring stretch standing, core engagement with all activity, increase posture awareness    Manual: []  NA    gentle leg pulls x3 chavez      ROM: [] NT     AROM RLE (degrees)  R Hip Flexion 0-125: 90(slr supine)  R Hip Extension 0-10: 10  R Hip ABduction 0-45: 45  R Hip ADduction 0-10: 10  R Hip External Rotation 0-45: 40  R Hip Internal Rotation 0-45: 40     AROM LLE (degrees)  L Hip Flexion 0-125: 85(slr supine)  L Hip Extension 0-10: 10  L Hip ABduction 0-45: 35  L Hip ADduction 0-10: 25  L Hip External Rotation 0-45: 35  L Hip Internal Rotation 0-45: 40                 HEP  Continue with current Home Exercise Program.  See Objective section for progression of HEP. Comments:       POST-PAIN    Pain Rating (0-10 pain scale): 0/10  Location and Pain Description same as pre-pain unless otherwise indicated. Action: [x] NA  [] Call Physician  [] Perform HEP  [] Meds as prescribed     ASSESSMENT:     Conditions Requiring Skilled Therapeutic Intervention  Body structures, Functions, Activity limitations: Decreased functional mobility ,Decreased endurance,Decreased ROM,Increased pain,Decreased strength  Assessment: Progressing toward rom goals, increased body postition and efficacy of stretches, continued with strengthening for meeting goals of poc. Discussed cont vs dc next visit. Tolerance to treatment:  [x] Good   [] Fair   [] Poor  [] Fatigued   [] Increased pain   Limited by:    Goals:  Patient goals : \"no pain\"  Short term goals  Time Frame for Short term goals: 2 wks  Short term goal 1: Pt will demonstrate improved L hip AROM WNL for carryover to decreased anterior hip pain. Long term goals  Time Frame for Long term goals : 6 wks  Long term goal 1: Pt will demonstrate indep and compliance with % of the time for self management of L hip pain. Long term goal 2: Pt will demonstrate improved score on LEFS >/= 75/80 for improved pt quality of life. Long term goal 3: The pt will report decreased pain </=1/10 at worst in order to increase ease with running and amb > 1 mile and decresed pain with transfers off the floor. Progress toward goals:progessing toward rom goals, hep  Goals Met:    []  See updated POC   Comments:    PLAN:  [x] Continue POC to pt tolerance  [] Hold PT for ___ days.   See note to physician  [] Discharge PT    Signature:   Electronically signed by Helene Nguyen Leticia Earl, PTA on 3/17/22 at 12:03 PM EDT    PT Individual Minutes  Time In: 1100  Time Out: 1150  Minutes: 50  Timed Code Treatment Minutes: 50 Minutes    Activity Minutes Units   Ther Ex 45 3   Manual  5 0   Neuro Ed 0 0   Estim 0 0

## 2022-03-21 ENCOUNTER — HOSPITAL ENCOUNTER (OUTPATIENT)
Dept: PHYSICAL THERAPY | Age: 22
Setting detail: THERAPIES SERIES
Discharge: HOME | End: 2022-03-21
Payer: COMMERCIAL

## 2022-03-21 PROCEDURE — 97110 THERAPEUTIC EXERCISES: CPT

## 2022-03-21 ASSESSMENT — PAIN SCALES - GENERAL: PAINLEVEL_OUTOF10: 0

## 2022-03-21 NOTE — PROGRESS NOTES
[facility]   Outpatient Physical Therapy   Treatment Note  [] [address]  [x] [facility] CENTER            of [address]  Date: 3/21/2022  Patient: Tommye Kehr  : 2000  ACCT #: [de-identified]  Referring Practitioner: Heather THORNTON-CNP  Diagnosis: hip pain, chronic left        Visit Information:  PT Visit Information  PT Insurance Information: [employer]  Total # of Visits Approved: 8 (eval only)  Total # of Visits to Date: 7  Plan of Care/Certification Expiration Date: 22  No Show: 0  Canceled Appointment: 3  Progress Note Counter:  ( units approved through 22)    SUBJECTIVE:   Subjective  Subjective: Pt reports that she is getting  stabbing pain occ in rt Lb when she is walking stabbing for a couple mins then pain the rest of the day. HEP Compliance:  [x] Good [] Fair [] Poor [] Reports not doing due to:    PAIN   Location:      Pain Rating (0-10 pain scale):  Pain Level: 0  Pain Description:     Action:  [] Acceptable for treatment  []  Other:    OBJECTIVE:   Exercises  Exercise 2: hip flexor stretch on step x30 x3  Exercise 3: SLR x15  Exercise 4: seated hip IR/ER with YTB (rtb unable to tolerate)  Exercise 5: clam/ reverse clam x15x2  Exercise 6: piriformis stretch 3x30 sec   Exercise 7: hamstring stretch step 30s x3  Exercise 9: bridge x15, bridge with heel raise to increase glut engagement x15  Exercise 11: sKTC 3 x30 sec   Exercise 13: monster walks rtb and lateral walks 15 feet x4    Strength: [] NT     Strength LLE  L Hip Flexion: 4+/5  L Hip Extension: 4+/5  L Hip ABduction: 4+/5  L Hip ADduction: 5/5  L Hip Internal Rotation: 4/5  L Hip External Rotation: 4/5  L Knee Flexion: 5/5  L Knee Extension: 5/5  L Ankle Dorsiflexion: 5/5       HEP  Continue with current Home Exercise Program.  See Objective section for progression of HEP.       Comments:       POST-PAIN    Pain Rating (0-10 pain scale): 0/10  Location and Pain Description same as pre-pain unless otherwise indicated. Action: [] NA  [] Call Physician  [x] Perform HEP  [] Meds as prescribed     ASSESSMENT:     Conditions Requiring Skilled Therapeutic Intervention  Body structures, Functions, Activity limitations: Decreased functional mobility ,Decreased endurance,Decreased ROM,Increased pain,Decreased strength  Assessment: Pt reports that pain off the floor is still a 5/10. Exam: LEFS 59/80        Tolerance to treatment:  [x] Good   [] Fair   [] Poor  [] Fatigued   [] Increased pain   Limited by:    Goals:     Short term goals  Time Frame for Short term goals: 2 wks  Short term goal 1: Pt will demonstrate improved L hip AROM WNL for carryover to decreased anterior hip pain. Long term goals  Time Frame for Long term goals : 6 wks  Long term goal 1: Pt will demonstrate indep and compliance with % of the time for self management of L hip pain. Long term goal 2: Pt will demonstrate improved score on LEFS >/= 75/80 for improved pt quality of life. Long term goal 3: The pt will report decreased pain </=1/10 at worst in order to increase ease with running and amb > 1 mile and decresed pain with transfers off the floor. Progress toward goals:   Goals Met:    [x]  See updated POC   Comments:    PLAN:  [x] Continue POC to pt tolerance  [] Hold PT for ___ days.   See note to physician  [] Discharge PT    Signature:   Electronically signed by Stephanie Brumfield PTA on 3/21/22 at 4:54 PM EDT    PT Individual Minutes  Time In: 1600  Time Out: 1470  Minutes: 43  Timed Code Treatment Minutes: 43 Minutes    Activity Minutes Units   Ther Ex 43 3

## 2022-03-21 NOTE — PROGRESS NOTES
[address] [facility]   [address] [place], [place]  Phone:  [phone]   Fax:  [phone]    [] Certification  [] Recertification [x]  Plan of Care  [] Progress Note   [] Discharge        [facility]  From:  David Gamboa, PT  Patient: Raulito Strauss     : 2000  Diagnosis: hip pain, chronic left     Date: 3/21/2022  Treatment Diagnosis: decreased hip ROM, decreased hip strength, decreased activity tolerance for running and amb prolonged distances >1 mile and increased pain L anterior hip with transferring off the floor    Plan of Care/Certification Expiration Date: 22  Progress Report Period from: 22  to 3/21/2022    Total # of Visits to Date: 7   No Show: 0    Canceled Appointment: 3     OBJECTIVE:   Short Term Goals - Time Frame for Short term goals: 2 wks    Goals Current/Discharge status  Met   Short term goal 1: Pt will demonstrate improved L hip AROM WNL for carryover to decreased anterior hip pain. AROM LLE (degrees)  LLE AROM :  (taken 3/17/22)  L Hip Flexion 0-125: 85(slr supine)  L Hip Extension 0-10: 10  L Hip ABduction 0-45: 35  L Hip ADduction 0-10: 25  L Hip External Rotation 0-45: 35  L Hip Internal Rotation 0-45: 40                  [x] yes  [x] no     Long Term Goals - Time Frame for Long term goals : 6 wks  Goals Current/ Discharge status Met   Long term goal 1: Pt will demonstrate indep and compliance with % of the time for self management of L hip pain. Progressing HEP [x] yes  [] no   Long term goal 2: Pt will demonstrate improved score on LEFS >/= 75/80 for improved pt quality of life. LEFS 59/80 [] yes  [x] no   Long term goal 3: The pt will report decreased pain </=1/10 at worst in order to increase ease with running and amb > 1 mile and decresed pain with transfers off the floor.  Pain 5/10 with transfers off the floor  [] yes  [x] no   Long term goal 4: Patient will increase strength in right LE to 5/5 for improved functional tolerance. (updated goal)    Strength LLE  L Hip Flexion: 4+/5  L Hip Extension: 4+/5  L Hip ABduction: 4+/5  L Hip ADduction: 5/5  L Hip Internal Rotation: 4/5  L Hip External Rotation: 4/5  L Knee Flexion: 5/5  L Knee Extension: 5/5  L Ankle Dorsiflexion: 5/5          [] yes  [x] no     Assessment: Patient demonstrates improvements in left hip ROM but continues to report pain with certain movements like getting up from the floor. Patient would benefit from further PT to work on strength and decrease pain for overall quality of life. New Education Provided:   HEP    PLAN: [x] Evaluate and Treat  Frequency/Duration:  Plan  Times per week: 1  Plan weeks: 4-6  Current Treatment Recommendations: Strengthening,Neuromuscular Re-education,Home Exercise Program,ROM,Integrated Dry Needling,Manual Therapy - Soft Tissue Mobilization,Safety Education & Training,Endurance Training,Patient/Caregiver Education & Training,Functional Mobility Training,Equipment Evaluation, Education, & procurement,Modalities,Pain Management,Positioning  Plan Comment: additonal visits     Precautions:             Patient Status:[] Continue/ Initate plan of Care    [] Discharge PT. Recommend pt continue with HEP. [x] Additional visits requested, Please re-certify for additional visits:        Signature: Electronically signed by Zhanna Wills PTA on 3/21/22 at 4:56 PM EDT  Electronically signed by Palak Paz PT on 3/21/2022 at 5:11 PM        If you have any questions or concerns, please don't hesitate to call. Thank you for your referral.    I have reviewed this plan of care and certify a need for medically necessary rehabilitation services.     Physician Signature:__________________________________________________________  Date:  Please sign and return

## 2022-03-28 ENCOUNTER — HOSPITAL ENCOUNTER (OUTPATIENT)
Dept: PHYSICAL THERAPY | Age: 22
Setting detail: THERAPIES SERIES
Discharge: HOME | End: 2022-03-28
Payer: COMMERCIAL

## 2022-04-04 ENCOUNTER — HOSPITAL ENCOUNTER (OUTPATIENT)
Dept: PHYSICAL THERAPY | Age: 22
Setting detail: THERAPIES SERIES
Discharge: HOME | End: 2022-04-04
Payer: COMMERCIAL

## 2022-04-04 PROCEDURE — 97110 THERAPEUTIC EXERCISES: CPT

## 2022-04-04 PROCEDURE — 97140 MANUAL THERAPY 1/> REGIONS: CPT

## 2022-04-04 ASSESSMENT — PAIN DESCRIPTION - LOCATION: LOCATION: BACK

## 2022-04-04 ASSESSMENT — PAIN DESCRIPTION - ORIENTATION: ORIENTATION: RIGHT

## 2022-04-04 NOTE — PROGRESS NOTES
[facility]   Outpatient Physical Therapy   Treatment Note  [] [address]  [x] [facility]            of [address]  Date: 2022  Patient: Marvin Adams  : 2000  ACCT #: [de-identified]  Referring Practitioner: Luz PACHECO  Diagnosis: hip pain, chronic left  Treatment Diagnosis: decreased hip ROM, decreased hip strength, decreased activity tolerance for running and amb prolonged distances >1 mile and increased pain L anterior hip with transferring off the floor     Visit Information:  PT Visit Information  PT Insurance Information: [employer]  Total # of Visits Approved: 8 (8+8)  Total # of Visits to Date: 8  Plan of Care/Certification Expiration Date: 22  No Show: 0  Canceled Appointment: 3  Progress Note Counter:  (24 new units 3/28-22)    SUBJECTIVE:   Subjective  Subjective: Pt reports the rt lb has been bothering her. HEP Compliance:  [x] Good [] Fair [] Poor [] Reports not doing due to:    PAIN   Location:   Pain Location: Back  Pain Rating (0-10 pain scale):   7/10  Pain Description:   ache  Action:  [x] Acceptable for treatment  []  Other:    OBJECTIVE:   Exercises  Exercise 1: isometric psoas 10 sec X 10  Exercise 2: [id number] x10   Exercise 6: piriformis stretch 3x30 sec   Exercise 7: hamstring stretch step 30s x3  Exercise 9: bridge x15, bridge with heel raise to increase glut engagement x15  Exercise 11: sKTC 3 x30 sec     Manual: []  NA   Manual therapy  Soft Tissue Mobalization: stm and mfr post hip     HEP  Continue with current Home Exercise Program.  See Objective section for progression of HEP. Comments:       POST-PAIN    Pain Rating (0-10 pain scale): 7/10  Location and Pain Description same as pre-pain unless otherwise indicated.   Action: [] NA  [] Call Physician  [x] Perform HEP  [] Meds as prescribed     ASSESSMENT:     Conditions Requiring Skilled Therapeutic Intervention  Body structures, Functions,

## 2022-04-11 ENCOUNTER — OFFICE VISIT (OUTPATIENT)
Dept: FAMILY MEDICINE CLINIC | Age: 22
End: 2022-04-11
Payer: COMMERCIAL

## 2022-04-11 ENCOUNTER — APPOINTMENT (OUTPATIENT)
Dept: PHYSICAL THERAPY | Age: 22
End: 2022-04-11
Payer: COMMERCIAL

## 2022-04-11 VITALS
BODY MASS INDEX: 36.64 KG/M2 | RESPIRATION RATE: 17 BRPM | WEIGHT: 228 LBS | DIASTOLIC BLOOD PRESSURE: 74 MMHG | SYSTOLIC BLOOD PRESSURE: 126 MMHG | HEIGHT: 66 IN | HEART RATE: 70 BPM

## 2022-04-11 DIAGNOSIS — G89.29 HIP PAIN, CHRONIC, LEFT: ICD-10-CM

## 2022-04-11 DIAGNOSIS — Z87.768 H/O DEVELOPMENTAL DYSPLASIA OF THE HIP: Primary | ICD-10-CM

## 2022-04-11 DIAGNOSIS — M54.41 ACUTE BILATERAL LOW BACK PAIN WITH RIGHT-SIDED SCIATICA: ICD-10-CM

## 2022-04-11 DIAGNOSIS — M25.552 HIP PAIN, CHRONIC, LEFT: ICD-10-CM

## 2022-04-11 PROCEDURE — 1036F TOBACCO NON-USER: CPT | Performed by: NURSE PRACTITIONER

## 2022-04-11 PROCEDURE — G8417 CALC BMI ABV UP PARAM F/U: HCPCS | Performed by: NURSE PRACTITIONER

## 2022-04-11 PROCEDURE — 99213 OFFICE O/P EST LOW 20 MIN: CPT | Performed by: NURSE PRACTITIONER

## 2022-04-11 PROCEDURE — G8427 DOCREV CUR MEDS BY ELIG CLIN: HCPCS | Performed by: NURSE PRACTITIONER

## 2022-04-11 RX ORDER — CYCLOBENZAPRINE HCL 10 MG
10 TABLET ORAL 3 TIMES DAILY PRN
Qty: 30 TABLET | Refills: 0 | Status: SHIPPED | OUTPATIENT
Start: 2022-04-11 | End: 2022-04-21

## 2022-04-11 RX ORDER — MELOXICAM 15 MG/1
15 TABLET ORAL DAILY PRN
Qty: 30 TABLET | Refills: 0 | Status: SHIPPED | OUTPATIENT
Start: 2022-04-11 | End: 2022-06-02 | Stop reason: SDUPTHER

## 2022-04-11 ASSESSMENT — ENCOUNTER SYMPTOMS
BOWEL INCONTINENCE: 0
ABDOMINAL PAIN: 0
BACK PAIN: 1

## 2022-04-11 ASSESSMENT — PATIENT HEALTH QUESTIONNAIRE - PHQ9
1. LITTLE INTEREST OR PLEASURE IN DOING THINGS: 0
2. FEELING DOWN, DEPRESSED OR HOPELESS: 0
SUM OF ALL RESPONSES TO PHQ QUESTIONS 1-9: 0
SUM OF ALL RESPONSES TO PHQ9 QUESTIONS 1 & 2: 0
SUM OF ALL RESPONSES TO PHQ QUESTIONS 1-9: 0

## 2022-04-11 NOTE — PROGRESS NOTES
Use    Vaping Use: Never used   Substance and Sexual Activity    Alcohol use: Never    Drug use: Never    Sexual activity: Not on file   Other Topics Concern    Not on file   Social History Narrative    Not on file     Social Determinants of Health     Financial Resource Strain: Low Risk     Difficulty of Paying Living Expenses: Not hard at all   Food Insecurity: No Food Insecurity    Worried About Running Out of Food in the Last Year: Never true    [address] in the Last Year: Never true   Transportation Needs:     Lack of Transportation (Medical): Not on file    Lack of Transportation (Non-Medical): Not on file   Physical Activity:     Days of Exercise per Week: Not on file    Minutes of Exercise per Session: Not on file   Stress:     Feeling of Stress : Not on file   Social Connections:     Frequency of Communication with Friends and Family: Not on file    Frequency of Social Gatherings with Friends and Family: Not on file    Attends [employer] Services: Not on file    Active Member of [address] [place] [address] or Organizations: Not on file    Attends Club or Organization Meetings: Not on file    Marital Status: Not on file   Intimate Partner Violence:     Fear of Current or Ex-Partner: Not on file    Emotionally Abused: Not on file    Physically Abused: Not on file    Sexually Abused: Not on file   Housing Stability:     Unable to Pay for Housing in the Last Year: Not on file    Number of [place] in the Last Year: Not on file    Unstable Housing in the Last Year: Not on file     No family history on file. No Known Allergies  Current Outpatient Medications   Medication Sig Dispense Refill    meloxicam (MOBIC) 15 MG tablet Take 1 tablet by mouth daily as needed for Pain 30 tablet 0    cyclobenzaprine (FLEXERIL) 10 MG tablet Take 1 tablet by mouth 3 times daily as needed for Muscle spasms 30 tablet 0     No current facility-administered medications for this visit.      PMH, Surgical Hx, Family Hx, and Social Hx reviewed and updated. Health Maintenance reviewed. Objective    Vitals:    04/11/22 1444   BP: 126/74   Pulse: 70   Resp: 17   Weight: 228 lb (103.4 kg)   Height: 5' 6\" (1.676 m)       Physical Exam  Constitutional:       General: She is not in acute distress. Appearance: She is well-developed. HENT:      Head: Normocephalic and atraumatic. Right Ear: External ear normal.      Left Ear: External ear normal.      Nose: Nose normal.   Eyes:      Conjunctiva/sclera: Conjunctivae normal.      Pupils: Pupils are equal, round, and reactive to light. Neck:      Vascular: No JVD. Cardiovascular:      Rate and Rhythm: Normal rate and regular rhythm. Heart sounds: Normal heart sounds. No murmur heard. Pulmonary:      Effort: Pulmonary effort is normal. No respiratory distress. Breath sounds: Normal breath sounds. No wheezing or rales. Abdominal:      General: Bowel sounds are normal. There is no distension. Palpations: Abdomen is soft. There is no mass. Tenderness: There is no abdominal tenderness. Musculoskeletal:         General: Tenderness present. No swelling, deformity or signs of injury. Cervical back: Neck supple. Right lower leg: No edema. Left lower leg: No edema. Skin:     General: Skin is warm and dry. Neurological:      Mental Status: She is alert and oriented to person, place, and time. Assessment & Plan   John Monge was seen today for back pain. Diagnoses and all orders for this visit:    H/O developmental dysplasia of the hip  -     [address], [place]  -     meloxicam (MOBIC) 15 MG tablet; Take 1 tablet by mouth daily as needed for Pain    Hip pain, chronic, left  -     [address], [place]  -     meloxicam (MOBIC) 15 MG tablet;  Take 1 tablet by mouth daily as needed for Pain    Acute bilateral low back pain with right-sided sciatica  -     meloxicam (MOBIC) 15 MG tablet; Take 1 tablet by mouth daily as needed for Pain    Other orders  -     cyclobenzaprine (FLEXERIL) 10 MG tablet; Take 1 tablet by mouth 3 times daily as needed for Muscle spasms      Orders Placed This Encounter   Procedures   [facility]     Referral Priority:   Routine     Referral Type:   Eval and Treat     Referral Reason:   Specialty Services Required     Requested Specialty:   Orthopedic Surgery     Number of Visits Requested:   1     Orders Placed This Encounter   Medications    meloxicam (MOBIC) 15 MG tablet     Sig: Take 1 tablet by mouth daily as needed for Pain     Dispense:  30 tablet     Refill:  0    cyclobenzaprine (FLEXERIL) 10 MG tablet     Sig: Take 1 tablet by mouth 3 times daily as needed for Muscle spasms     Dispense:  30 tablet     Refill:  0     There are no discontinued medications. Return if symptoms worsen or fail to improve. Reviewed with the patient: current clinical status, medications, activities and diet. Side effects, adverse effects of the medication prescribed today, as well as treatment plan/ rationale and result expectations have been discussed with the patient who expresses understanding and desires to proceed. Close follow up to evaluate treatment results and for coordination of care. I have reviewed the patient's medical history in detail and updated the computerized patient record.     King Oma, APRN - CNP

## 2022-04-19 ENCOUNTER — HOSPITAL ENCOUNTER (OUTPATIENT)
Dept: PHYSICAL THERAPY | Age: 22
Setting detail: THERAPIES SERIES
Discharge: HOME | End: 2022-04-19
Payer: COMMERCIAL

## 2022-04-19 NOTE — PROGRESS NOTES
[address] [place] [address] Drive of [address] [facility]      Physical Therapy  Cancellation/No-show Note          Patient Name:  Glen Goncalves  :  2000   Date:  2022  Referring Practitioner: Gege PACHECO  Diagnosis: hip pain, chronic left    Visit Information:  PT Visit Information  PT Insurance Information: [employer]  Total # of Visits Approved: 8 (8+8)  Plan of Care/Certification Expiration Date: 22  Canceled Appointment: 4  Progress Note Counter:  cx (24 new units 3/28-22)    For today's appointment patient:  [x]  Cancelled  []  Rescheduled appointment  []  No-show   []  Called pt to remind of next appointment     Reason given by patient:  []  Patient ill  []  Conflicting appointment  []  No transportation    []  Conflict with work  []  Weather  [x]  No reason given   []  Other:       Comments:       Signature: Electronically signed by Sergey Abbott PTA on 22 at 4:23 PM EDT

## 2022-04-25 ENCOUNTER — HOSPITAL ENCOUNTER (OUTPATIENT)
Dept: PHYSICAL THERAPY | Age: 22
Setting detail: THERAPIES SERIES
Discharge: HOME | End: 2022-04-25
Payer: COMMERCIAL

## 2022-04-25 PROCEDURE — 97110 THERAPEUTIC EXERCISES: CPT

## 2022-04-25 ASSESSMENT — PAIN DESCRIPTION - LOCATION: LOCATION: HIP

## 2022-04-25 ASSESSMENT — PAIN DESCRIPTION - ORIENTATION: ORIENTATION: LEFT

## 2022-05-02 ENCOUNTER — HOSPITAL ENCOUNTER (OUTPATIENT)
Dept: PHYSICAL THERAPY | Age: 22
Setting detail: THERAPIES SERIES
Discharge: HOME | End: 2022-05-02
Payer: COMMERCIAL

## 2022-05-02 NOTE — PROGRESS NOTES
[address]       Physical Therapy  Cancellation/No-show Note  Patient Name:  Belle Doyle  :  2000   Date:  2022     Diagnosis: hip pain, chronic left    Visit Information:  PT Visit Information  PT Insurance Information: [employer]  Total # of Visits Approved: 8 (8+8)  Total # of Visits to Date: 8  Plan of Care/Certification Expiration Date: 22  No Show: 0  Canceled Appointment: 5  Progress Note Counter:  cx 22 (24 new units 3/28-22)    For today's appointment patient:  [x]  Cancelled  []  Rescheduled appointment  []  No-show   []  Called pt to remind of next appointment     Reason given by patient:  []  Patient ill  []  Conflicting appointment  []  No transportation    []  Conflict with work  [x]  No reason given  []  Other:       Comments:       Signature: Electronically signed by Naun Nava PT on 22 at 4:47 PM EDT

## 2022-05-05 ENCOUNTER — OFFICE VISIT (OUTPATIENT)
Dept: ORTHOPEDIC SURGERY | Age: 22
End: 2022-05-05
Payer: COMMERCIAL

## 2022-05-05 VITALS
TEMPERATURE: 97.4 F | HEIGHT: 66 IN | HEART RATE: 107 BPM | BODY MASS INDEX: 36.64 KG/M2 | WEIGHT: 228 LBS | OXYGEN SATURATION: 100 %

## 2022-05-05 DIAGNOSIS — M54.31 RIGHT SIDED SCIATICA: Primary | ICD-10-CM

## 2022-05-05 DIAGNOSIS — M25.552 LEFT HIP PAIN: ICD-10-CM

## 2022-05-05 PROCEDURE — 99204 OFFICE O/P NEW MOD 45 MIN: CPT | Performed by: ORTHOPAEDIC SURGERY

## 2022-05-05 PROCEDURE — G8417 CALC BMI ABV UP PARAM F/U: HCPCS | Performed by: ORTHOPAEDIC SURGERY

## 2022-05-05 PROCEDURE — 1036F TOBACCO NON-USER: CPT | Performed by: ORTHOPAEDIC SURGERY

## 2022-05-05 PROCEDURE — G8427 DOCREV CUR MEDS BY ELIG CLIN: HCPCS | Performed by: ORTHOPAEDIC SURGERY

## 2022-05-05 NOTE — PROGRESS NOTES
Subjective:      Patient ID: Daryle Auerbach is a 20 y.o. female who presents today for:  Chief Complaint   Patient presents with    Hip Pain     chronic left knee pain. She had hip dsyplasia when young. X-ray 01/06/2022       HPI  The patient she has been experiencing pain involving the left hip if she moves or twist the wrong way. This pain is all directed in the anterior aspect of the left groin and seems to bother her in certain movements, standing for long  periods of time, etc.  She is also been experiencing significant amounts of lower back pain that started to occur when she started physical therapy for her hips complains of pain that starts in her lower back and radiates down the posterior aspect of her right hip all the way down to her heel  Of importance that she did have a pelvic osteotomy done at 17 months for hip dysplasia    No past medical history on file. No past surgical history on file. Social History     Socioeconomic History    Marital status: Single     Spouse name: Not on file    Number of children: Not on file    Years of education: Not on file    Highest education level: Not on file   Occupational History    Not on file   Tobacco Use    Smoking status: Never Smoker    Smokeless tobacco: Never Used   Vaping Use    Vaping Use: Never used   Substance and Sexual Activity    Alcohol use: Never    Drug use: Never    Sexual activity: Not on file   Other Topics Concern    Not on file   Social History Narrative    Not on file     Social Determinants of Health     Financial Resource Strain: Low Risk     Difficulty of Paying Living Expenses: Not hard at all   Food Insecurity: No Food Insecurity    Worried About Running Out of Food in the Last Year: Never true    [address] in the Last Year: Never true   Transportation Needs:     Lack of Transportation (Medical): Not on file    Lack of Transportation (Non-Medical):  Not on file   Physical Activity:     Days of Exercise per Week: Not on file    Minutes of Exercise per Session: Not on file   Stress:     Feeling of Stress : Not on file   Social Connections:     Frequency of Communication with Friends and Family: Not on file    Frequency of Social Gatherings with Friends and Family: Not on file    Attends Judaism Services: Not on file    Active Member of [address] [place] [address] or Organizations: Not on file    Attends Club or Organization Meetings: Not on file    Marital Status: Not on file   Intimate Partner Violence:     Fear of Current or Ex-Partner: Not on file    Emotionally Abused: Not on file    Physically Abused: Not on file    Sexually Abused: Not on file   Housing Stability:     Unable to Pay for Housing in the Last Year: Not on file    Number of [place] in the Last Year: Not on file    Unstable Housing in the Last Year: Not on file     No Known Allergies  Current Outpatient Medications on File Prior to Visit   Medication Sig Dispense Refill    meloxicam (MOBIC) 15 MG tablet Take 1 tablet by mouth daily as needed for Pain 30 tablet 0     No current facility-administered medications on file prior to visit. Review of Systems    Objective:   Pulse 107   Temp 97.4 °F (36.3 °C) (Temporal)   Ht 5' 6\" (1.676 m)   Wt 228 lb (103.4 kg)   SpO2 100%   BMI 36.80 kg/m²   Ortho Exam   Examination demonstrates slight limp when she ambulates, left hip that ranges from neutral to 90 degrees only 20 degrees internal rotation 70 degrees external rotation compared to the right hip which flexes from 0-120 with 20 degrees internal rotation and 90 degrees of external rotation. There is no pain over either greater trochanters. Examination of her lumbar spine demonstrates full painless range of motion but she does have tenderness palpation over the PSIS on the right side as well as side bending to the right.   Her straight leg raise is moderately positive on the right, she has no neurologic deficits at this time  Radiographs and Laboratory Studies:     Diagnostic Imaging Studies:    Previous radiographs taken of her hip demonstrates a pelvis that does demonstrate most likely bone graft that was removed from her hemipelvis, but she demonstrates no sign of pelvic dysplasia no sign of osteoarthritis no sign of fracture, etc.    Assessment:       Diagnosis Orders   1. Right sided sciatica  MRI LUMBAR SPINE WO CONTRAST   2. Left hip pain  MRI HIP LEFT WO CONTRAST         Plan:     Feel at this time she is had pain long enough to warrant an MRI of her left hip, we will also obtain an MRI of her lumbar spine to see if indeed she started to develop sciatica as well, she does do a fair amount of drywalling which requires heavy lifting twisting, etc. come back and see me after both of these studies are done     Orders Placed This Encounter   Procedures    MRI HIP LEFT WO CONTRAST     Standing Status:   Future     Standing Expiration Date:   5/5/2023     Order Specific Question:   Reason for exam:     Answer:   left hip    MRI LUMBAR SPINE WO CONTRAST     Standing Status:   Future     Standing Expiration Date:   5/5/2023     No orders of the defined types were placed in this encounter. No follow-ups on file.       Ruperto Shahid MD

## 2022-05-09 ENCOUNTER — HOSPITAL ENCOUNTER (OUTPATIENT)
Dept: PHYSICAL THERAPY | Age: 22
Setting detail: THERAPIES SERIES
Discharge: HOME | End: 2022-05-09
Payer: COMMERCIAL

## 2022-05-09 NOTE — PROGRESS NOTES
[address] of [address] [facility]      Physical Therapy  Cancellation/No-show Note          Patient Name:  Piedad Roberts  :  2000   Date:  2022     Diagnosis: hip pain, chronic left    Visit Information:  PT Visit Information  PT Insurance Information: [employer]  Total # of Visits Approved: 8 (8+8)  Total # of Visits to Date: 8  Plan of Care/Certification Expiration Date: 22  No Show: 0  Canceled Appointment: 6  Progress Note Counter:  cx 22 (24 new units 3/28-22)    For today's appointment patient:  [x]  Cancelled  []  Rescheduled appointment  []  No-show   []  Called pt to remind of next appointment     Reason given by patient:  []  Patient ill  []  Conflicting appointment  []  No transportation    [x]  Conflict with work  []  Weather  []  No reason given   []  Other:       Comments:       Signature: Electronically signed by Lina Carrington PTA on 22 at 4:35 PM EDT

## 2022-05-16 ENCOUNTER — HOSPITAL ENCOUNTER (OUTPATIENT)
Dept: PHYSICAL THERAPY | Age: 22
Setting detail: THERAPIES SERIES
Discharge: HOME | End: 2022-05-16
Payer: COMMERCIAL

## 2022-05-16 ENCOUNTER — APPOINTMENT (OUTPATIENT)
Dept: MRI IMAGING | Age: 22
End: 2022-05-16
Payer: COMMERCIAL

## 2022-05-16 ENCOUNTER — HOSPITAL ENCOUNTER (OUTPATIENT)
Dept: MRI IMAGING | Age: 22
Discharge: HOME | End: 2022-05-18
Payer: COMMERCIAL

## 2022-05-16 DIAGNOSIS — M25.552 LEFT HIP PAIN: ICD-10-CM

## 2022-05-16 PROCEDURE — 97110 THERAPEUTIC EXERCISES: CPT

## 2022-05-16 PROCEDURE — 73721 MRI JNT OF LWR EXTRE W/O DYE: CPT

## 2022-05-16 ASSESSMENT — PAIN DESCRIPTION - ORIENTATION: ORIENTATION: RIGHT

## 2022-05-16 ASSESSMENT — PAIN DESCRIPTION - LOCATION: LOCATION: BACK

## 2022-05-16 NOTE — PROGRESS NOTES
[address] [place] [address] Drive of [address] [facility]  Outpatient Physical Therapy   Treatment Note                   Date: 2022  Patient: Harish Clark  : 2000   Confirmed: yes  MRN: [id number]  Referring Provider: Alyson Cantu APR*   Secondary Referring Provider:     Diagnosis: hip pain, chronic left  Treatment Diagnosis: decreased hip ROM, decreased hip strength, decreased activity tolerance for running and amb prolonged distances >1 mile and increased pain L anterior hip with transferring off the floor    Visit Information:  PT Visit Information  PT Insurance Information: [employer]  Total # of Visits Approved: 8 (8+8)  Total # of Visits to Date: 8  Plan of Care/Certification Expiration Date: 22  No Show: 0  Canceled Appointment: 6  Progress Note Counter: 3/8  (24 new units 3/28-22)    Subjective Information:  Subjective: pt reports that she had an mri on her hip today awaiting one on her back   HEP Compliance:  [x] Good [] Fair [] Poor [] Reports not doing due to:    Pain Screening  Patient's Stated Pain Goal: 6  Pain Location: Back  Pain Orientation: Right    Objective:    Exercises:  Exercises  Exercise 1: supinae walkouts   Exercise 2: [id number] x10   Exercise 6: piriformis stretch 3x30 sec   Exercise 7: hamstring stretch step 30s x3  Exercise 11: sKTC 3 x30 sec       Assessment: Body Structures, Functions, Activity Limitations Requiring Skilled Therapeutic Intervention: Decreased functional mobility ,Decreased endurance,Decreased ROM,Increased pain,Decreased strength  Assessment: MOdified treatment until results from MRI are obtained. Pt with no inceased pain.   Treatment Diagnosis: decreased hip ROM, decreased hip strength, decreased activity tolerance for running and amb prolonged distances >1 mile and increased pain L anterior hip with transferring off the floor             Post-Pain Assessment:       Pain Rating (0-10 pain scale):  0  /10   Location and pain description same as pre-treatment unless indicated. Action: [] NA   [x] Perform HEP  [] Meds as prescribed  [] Modalities as prescribed   [] Call Physician     GOALS   Patient Goal(s):    Short Term Goals Completed by 2 wks Goal Status   Pt will demonstrate improved L hip AROM WNL for carryover to decreased anterior hip pain. in progress                                Long Term Goals Completed by 6 wks Goal Status   Pt will demonstrate indep and compliance with % of the time for self management of L hip pain. In progress   Pt will demonstrate improved score on LEFS >/= 75/80 for improved pt quality of life. In progress   The pt will report decreased pain </=1/10 at worst in order to increase ease with running and amb > 1 mile and decresed pain with transfers off the floor. In progress   Patient will increase strength in LT LE to 5/5 for improved functional tolerance. In progress                       Plan:    Frequency/Duration:  Plan  Plan weeks: 4-6  Current Treatment Recommendations: Strengthening,Neuromuscular re-education,Manual Therapy - Soft Tissue Mobilization,Safety education & training,Equipment evaluation, education, & procurement,Patient/Caregiver education & training,Integrated dry needling,Endurance training,Functional mobility training,ROM    [x] Continue POC to pt tolerance  [] Hold PT for ___ days. See note to physician  [] Discharge PT    Pt to continue current HEP. See objective section for any therapeutic exercise changes, additions or modifications this date.     Therapy Time:      PT Individual Minutes  Time In: 1600  Time Out: 0941  Minutes: 38  Timed Code Treatment Minutes: 38 Minutes    Timed Activity Minutes Units   Ther Ex 38 3     Electronically signed by Lucy Sigala PTA on 5/16/22 at 5:12 PM EDT

## 2022-05-25 ENCOUNTER — TELEPHONE (OUTPATIENT)
Dept: ORTHOPEDIC SURGERY | Age: 22
End: 2022-05-25

## 2022-06-02 DIAGNOSIS — Z87.768 H/O DEVELOPMENTAL DYSPLASIA OF THE HIP: ICD-10-CM

## 2022-06-02 DIAGNOSIS — M54.41 ACUTE BILATERAL LOW BACK PAIN WITH RIGHT-SIDED SCIATICA: ICD-10-CM

## 2022-06-02 DIAGNOSIS — G89.29 HIP PAIN, CHRONIC, LEFT: ICD-10-CM

## 2022-06-02 DIAGNOSIS — M25.552 HIP PAIN, CHRONIC, LEFT: ICD-10-CM

## 2022-06-02 RX ORDER — MELOXICAM 15 MG/1
15 TABLET ORAL DAILY PRN
Qty: 30 TABLET | Refills: 5 | Status: SHIPPED | OUTPATIENT
Start: 2022-06-02

## 2022-06-02 NOTE — TELEPHONE ENCOUNTER
pt requesting medication refill. Please approve or deny this request.    Rx requested:  Requested Prescriptions     Pending Prescriptions Disp Refills    meloxicam (MOBIC) 15 MG tablet 30 tablet 0     Sig: Take 1 tablet by mouth daily as needed for Pain         Last Office Visit:   4/11/2022      Next Visit Date:  No future appointments.

## 2022-06-03 RX ORDER — CYCLOBENZAPRINE HCL 10 MG
10 TABLET ORAL 3 TIMES DAILY PRN
COMMUNITY
End: 2022-06-03 | Stop reason: SDUPTHER

## 2022-06-03 NOTE — TELEPHONE ENCOUNTER
pt requesting medication refill. Please approve or deny this request.    Rx requested:  Requested Prescriptions     Pending Prescriptions Disp Refills    cyclobenzaprine (FLEXERIL) 10 mg tablet       Sig: Take 1 tablet by mouth 3 times daily as needed         Last Office Visit:   4/11/2022      Next Visit Date:  No future appointments.

## 2022-06-06 RX ORDER — CYCLOBENZAPRINE HCL 10 MG
10 TABLET ORAL 3 TIMES DAILY PRN
Qty: 60 TABLET | Refills: 2 | Status: SHIPPED | OUTPATIENT
Start: 2022-06-06

## 2022-06-14 ENCOUNTER — CLINICAL DOCUMENTATION (OUTPATIENT)
Dept: PHYSICAL THERAPY | Age: 22
End: 2022-06-14

## 2022-06-14 NOTE — PROGRESS NOTES
[facility]    []  [address]  [x]  [address]      [address] [place] [address]  Ph: [phone]     Ph: [phone]  Fax: [phone]     Fax: [phone]      Date: 2022  Patient Name: Tricia Ely  : 2000  MRN: [id number]    Pt not scheduled with any further appointments. Pt stated saw  today. He wants a ct scan to determine what type of surgery.      Electronically signed by Belksy Berger PTA on 2022 at 10:56 AM

## 2022-06-20 ENCOUNTER — CLINICAL DOCUMENTATION (OUTPATIENT)
Dept: PHYSICAL THERAPY | Age: 22
End: 2022-06-20

## 2022-06-20 NOTE — DISCHARGE SUMMARY
[facility]   [address] [place], [place]  Phone:  [phone]   Fax:  [phone]        []   Certification  [] Recertification []  Plan of Care  [] Progress Note [x] Discharge      Referring Provider: Edgar Roe CNP      From:  Pop Rubio, PT   Patient: Yesi Romeo (20 y.o. female) : 2000 Date: 2022   Medical Diagnosis: No admission diagnoses are documented for this encounter. hip pain, chronic left  Treatment Diagnosis: decreased hip ROM, decreased hip strength, decreased activity tolerance for running and amb prolonged distances >1 mile and increased pain L anterior hip with transferring off the floor    Plan of Care/Certification Expiration Date: : 22   Progress Report Period from:  2022  to 2022    Visits to Date: 8 No Show: 0 Cancelled Appts: 6    OBJECTIVE:   Short Term Goals - Time Frame for Short term goals: 2 wks    Goals Current/Discharge status  Status   Short term goal 1: Pt will demonstrate improved L hip AROM WNL for carryover to decreased anterior hip pain. AROM LLE (degrees)  LLE AROM :  (taken 3/17/22)  L Hip Flexion 0-125: 85(slr supine)  L Hip Extension 0-10: 10  L Hip ABduction 0-45: 35  L Hip ADduction 0-10: 25  L Hip External Rotation 0-45: 35  L Hip Internal Rotation 0-45: 40   AROM RLE (degrees)  RLE AROM:  (taken 3/17/2022)  R Hip Flexion 0-125: 90(slr supine)  R Hip Extension 0-10: 10  R Hip ABduction 0-45: 45  R Hip ADduction 0-10: 10  R Hip External Rotation 0-45: 40  R Hip Internal Rotation 0-45: 40    taken on 3/21/22                            Met     Long Term Goals - Time Frame for Long term goals : 6 wks  Goals Current/ Discharge status Met   Long term goal 1: Pt will demonstrate indep and compliance with % of the time for self management of L hip pain. Patient was independent with HEP.   Met   Long term goal 2: Pt will demonstrate improved score on LEFS >/= 75/80 for improved pt quality of life. NT due to unexpected D/C  unable to assess   Long term goal 3: The pt will report decreased pain </=1/10 at worst in order to increase ease with running and amb > 1 mile and decresed pain with transfers off the floor. Patient reports 6/10 pain on 5/16/22 in back. Not met   Long term goal 4: Patient will increase strength in LT LE to 5/5 for improved functional tolerance. Strength LLE  L Hip Flexion: 4+/5  L Hip Extension: 4+/5  L Hip ABduction: 4+/5  L Hip ADduction: 5/5  L Hip Internal Rotation: 4/5  L Hip External Rotation: 4/5  L Knee Flexion: 5/5  L Knee Extension: 5/5  L Ankle Dorsiflexion: 5/5      Taken on 3/21/22       Not met       Assessment: Patient is ok with discharge at this time. Currently, patient is following up with orthopedic about continued patient. D/C from PT. PLAN: D/C from PT        Precautions:                            Patient Status:[] Continue/ Initiate plan of Care    [x] Discharge PT. Recommend pt continue with HEP. [] Additional visits requested, Please re-certify for additional visits:        Signature: Electronically signed by Alexa Mills PT on 6/20/22 at 2:50 PM EDT      If you have any questions or concerns, please don't hesitate to call. Thank you for your referral.    I have reviewed this plan of care and certify a need for medically necessary rehabilitation services.     Physician Signature:__________________________________________________________  Date:  Please sign and return

## 2023-09-15 ENCOUNTER — OFFICE VISIT (OUTPATIENT)
Dept: FAMILY MEDICINE CLINIC | Age: 23
End: 2023-09-15
Payer: COMMERCIAL

## 2023-09-15 VITALS
HEIGHT: 66 IN | TEMPERATURE: 97.6 F | BODY MASS INDEX: 36.8 KG/M2 | WEIGHT: 229 LBS | DIASTOLIC BLOOD PRESSURE: 82 MMHG | SYSTOLIC BLOOD PRESSURE: 120 MMHG | OXYGEN SATURATION: 100 % | HEART RATE: 85 BPM

## 2023-09-15 DIAGNOSIS — H65.93 MIDDLE EAR EFFUSION, BILATERAL: Primary | ICD-10-CM

## 2023-09-15 PROCEDURE — 1036F TOBACCO NON-USER: CPT | Performed by: PHYSICIAN ASSISTANT

## 2023-09-15 PROCEDURE — G8427 DOCREV CUR MEDS BY ELIG CLIN: HCPCS | Performed by: PHYSICIAN ASSISTANT

## 2023-09-15 PROCEDURE — 99213 OFFICE O/P EST LOW 20 MIN: CPT | Performed by: PHYSICIAN ASSISTANT

## 2023-09-15 PROCEDURE — G8417 CALC BMI ABV UP PARAM F/U: HCPCS | Performed by: PHYSICIAN ASSISTANT

## 2023-09-15 RX ORDER — LORATADINE 10 MG/1
10 TABLET ORAL DAILY
Qty: 30 TABLET | Refills: 2 | Status: SHIPPED | OUTPATIENT
Start: 2023-09-15

## 2023-09-15 RX ORDER — FLUTICASONE PROPIONATE 50 MCG
1 SPRAY, SUSPENSION (ML) NASAL DAILY
Qty: 16 G | Refills: 2 | Status: SHIPPED | OUTPATIENT
Start: 2023-09-15

## 2023-09-15 SDOH — ECONOMIC STABILITY: FOOD INSECURITY: WITHIN THE PAST 12 MONTHS, THE FOOD YOU BOUGHT JUST DIDN'T LAST AND YOU DIDN'T HAVE MONEY TO GET MORE.: NEVER TRUE

## 2023-09-15 SDOH — ECONOMIC STABILITY: FOOD INSECURITY: WITHIN THE PAST 12 MONTHS, YOU WORRIED THAT YOUR FOOD WOULD RUN OUT BEFORE YOU GOT MONEY TO BUY MORE.: NEVER TRUE

## 2023-09-15 SDOH — ECONOMIC STABILITY: INCOME INSECURITY: HOW HARD IS IT FOR YOU TO PAY FOR THE VERY BASICS LIKE FOOD, HOUSING, MEDICAL CARE, AND HEATING?: NOT HARD AT ALL

## 2023-09-15 SDOH — ECONOMIC STABILITY: HOUSING INSECURITY
IN THE LAST 12 MONTHS, WAS THERE A TIME WHEN YOU DID NOT HAVE A STEADY PLACE TO SLEEP OR SLEPT IN A SHELTER (INCLUDING NOW)?: NO

## 2023-09-15 ASSESSMENT — PATIENT HEALTH QUESTIONNAIRE - PHQ9
1. LITTLE INTEREST OR PLEASURE IN DOING THINGS: NOT AT ALL
1. LITTLE INTEREST OR PLEASURE IN DOING THINGS: 0
SUM OF ALL RESPONSES TO PHQ QUESTIONS 1-9: 0
2. FEELING DOWN, DEPRESSED OR HOPELESS: 0
2. FEELING DOWN, DEPRESSED OR HOPELESS: NOT AT ALL
SUM OF ALL RESPONSES TO PHQ QUESTIONS 1-9: 0
SUM OF ALL RESPONSES TO PHQ QUESTIONS 1-9: 0
SUM OF ALL RESPONSES TO PHQ9 QUESTIONS 1 & 2: 0
SUM OF ALL RESPONSES TO PHQ9 QUESTIONS 1 & 2: 0
SUM OF ALL RESPONSES TO PHQ QUESTIONS 1-9: 0

## 2023-09-15 NOTE — PROGRESS NOTES
Subjective  Albert Brown, 24 y.o. female presents today with:  Chief Complaint   Patient presents with    Ear Fullness     Patient presents today with ear fullness since last Tuesday. HPI  Patient is here being seen acutely for left ear infection . Treated for inner and outer ear infection no longer having pain but hearing is muffled    Review of Systems   Genitourinary:         Lmp on ocps   All other systems reviewed and are negative. No past medical history on file. No past surgical history on file. Social History     Socioeconomic History    Marital status: Single     Spouse name: Not on file    Number of children: Not on file    Years of education: Not on file    Highest education level: Not on file   Occupational History    Not on file   Tobacco Use    Smoking status: Never    Smokeless tobacco: Never   Vaping Use    Vaping Use: Never used   Substance and Sexual Activity    Alcohol use: Never    Drug use: Never    Sexual activity: Not on file   Other Topics Concern    Not on file   Social History Narrative    Not on file     Social Determinants of Health     Financial Resource Strain: Low Risk  (9/15/2023)    Overall Financial Resource Strain (CARDIA)     Difficulty of Paying Living Expenses: Not hard at all   Food Insecurity: No Food Insecurity (9/15/2023)    Hunger Vital Sign     Worried About Running Out of Food in the Last Year: Never true     [address] in the Last Year: Never true   Transportation Needs: Unknown (9/15/2023)    PRAPARE - Transportation     Lack of Transportation (Medical): Not on file     Lack of Transportation (Non-Medical):  No   Physical Activity: Not on file   Stress: Not on file   Social Connections: Not on file   Intimate Partner Violence: Not on file   Housing Stability: Unknown (9/15/2023)    Housing Stability Vital Sign     Unable to Pay for Housing in the Last Year: Not on file     Number of Places Lived in the Last Year: Not on file     Unstable Housing

## 2024-03-15 ENCOUNTER — OFFICE VISIT (OUTPATIENT)
Dept: FAMILY MEDICINE CLINIC | Age: 24
End: 2024-03-15

## 2024-03-15 VITALS
HEIGHT: 66 IN | WEIGHT: 228 LBS | BODY MASS INDEX: 36.64 KG/M2 | HEART RATE: 88 BPM | SYSTOLIC BLOOD PRESSURE: 124 MMHG | DIASTOLIC BLOOD PRESSURE: 78 MMHG

## 2024-03-15 DIAGNOSIS — Z00.00 ANNUAL PHYSICAL EXAM: Primary | ICD-10-CM

## 2024-03-15 DIAGNOSIS — R10.10 PAIN OF UPPER ABDOMEN: ICD-10-CM

## 2024-03-15 DIAGNOSIS — F41.1 GAD (GENERALIZED ANXIETY DISORDER): ICD-10-CM

## 2024-03-15 DIAGNOSIS — K21.9 GASTROESOPHAGEAL REFLUX DISEASE, UNSPECIFIED WHETHER ESOPHAGITIS PRESENT: ICD-10-CM

## 2024-03-15 DIAGNOSIS — Z00.00 ANNUAL PHYSICAL EXAM: ICD-10-CM

## 2024-03-15 LAB
ALBUMIN SERPL-MCNC: 4.1 G/DL (ref 3.5–4.6)
ALP SERPL-CCNC: 52 U/L (ref 40–130)
ALT SERPL-CCNC: 24 U/L (ref 0–33)
ANION GAP SERPL CALCULATED.3IONS-SCNC: 13 MEQ/L (ref 9–15)
AST SERPL-CCNC: 21 U/L (ref 0–35)
BASOPHILS # BLD: 0 K/UL (ref 0–0.2)
BASOPHILS NFR BLD: 0.5 %
BILIRUB DIRECT SERPL-MCNC: <0.2 MG/DL (ref 0–0.4)
BILIRUB INDIRECT SERPL-MCNC: NORMAL MG/DL (ref 0–0.6)
BILIRUB SERPL-MCNC: <0.2 MG/DL (ref 0.2–0.7)
BUN SERPL-MCNC: 8 MG/DL (ref 6–20)
CALCIUM SERPL-MCNC: 9 MG/DL (ref 8.5–9.9)
CHLORIDE SERPL-SCNC: 105 MEQ/L (ref 95–107)
CHOLEST SERPL-MCNC: 155 MG/DL (ref 0–199)
CO2 SERPL-SCNC: 21 MEQ/L (ref 20–31)
CREAT SERPL-MCNC: 0.81 MG/DL (ref 0.5–0.9)
EOSINOPHIL # BLD: 0.1 K/UL (ref 0–0.7)
EOSINOPHIL NFR BLD: 1.1 %
ERYTHROCYTE [DISTWIDTH] IN BLOOD BY AUTOMATED COUNT: 14.3 % (ref 11.5–14.5)
GLOBULIN SER CALC-MCNC: 2.9 G/DL (ref 2.3–3.5)
GLUCOSE SERPL-MCNC: 96 MG/DL (ref 70–99)
HCT VFR BLD AUTO: 41 % (ref 37–47)
HDLC SERPL-MCNC: 39 MG/DL (ref 40–59)
HGB BLD-MCNC: 12.9 G/DL (ref 12–16)
LDLC SERPL CALC-MCNC: 101 MG/DL (ref 0–129)
LIPASE SERPL-CCNC: 24 U/L (ref 12–95)
LYMPHOCYTES # BLD: 1.5 K/UL (ref 1–4.8)
LYMPHOCYTES NFR BLD: 26.5 %
MCH RBC QN AUTO: 27.2 PG (ref 27–31.3)
MCHC RBC AUTO-ENTMCNC: 31.5 % (ref 33–37)
MCV RBC AUTO: 86.5 FL (ref 79.4–94.8)
MONOCYTES # BLD: 0.5 K/UL (ref 0.2–0.8)
MONOCYTES NFR BLD: 9.2 %
NEUTROPHILS # BLD: 3.5 K/UL (ref 1.4–6.5)
NEUTS SEG NFR BLD: 62.5 %
PLATELET # BLD AUTO: 324 K/UL (ref 130–400)
POTASSIUM SERPL-SCNC: 4.5 MEQ/L (ref 3.4–4.9)
PROT SERPL-MCNC: 7 G/DL (ref 6.3–8)
RBC # BLD AUTO: 4.74 M/UL (ref 4.2–5.4)
SODIUM SERPL-SCNC: 139 MEQ/L (ref 135–144)
TRIGL SERPL-MCNC: 74 MG/DL (ref 0–150)
WBC # BLD AUTO: 5.5 K/UL (ref 4.8–10.8)

## 2024-03-15 RX ORDER — OMEPRAZOLE 20 MG/1
20 CAPSULE, DELAYED RELEASE ORAL
Qty: 30 CAPSULE | Refills: 5 | Status: SHIPPED | OUTPATIENT
Start: 2024-03-15

## 2024-03-15 RX ORDER — FLUOXETINE 10 MG/1
10 CAPSULE ORAL DAILY
Qty: 30 CAPSULE | Refills: 5 | Status: SHIPPED | OUTPATIENT
Start: 2024-03-15

## 2024-03-15 ASSESSMENT — ANXIETY QUESTIONNAIRES
GAD7 TOTAL SCORE: 18
2. NOT BEING ABLE TO STOP OR CONTROL WORRYING: NEARLY EVERY DAY
6. BECOMING EASILY ANNOYED OR IRRITABLE: NEARLY EVERY DAY
IF YOU CHECKED OFF ANY PROBLEMS ON THIS QUESTIONNAIRE, HOW DIFFICULT HAVE THESE PROBLEMS MADE IT FOR YOU TO DO YOUR WORK, TAKE CARE OF THINGS AT HOME, OR GET ALONG WITH OTHER PEOPLE: VERY DIFFICULT
1. FEELING NERVOUS, ANXIOUS, OR ON EDGE: NEARLY EVERY DAY
7. FEELING AFRAID AS IF SOMETHING AWFUL MIGHT HAPPEN: NEARLY EVERY DAY
4. TROUBLE RELAXING: MORE THAN HALF THE DAYS
3. WORRYING TOO MUCH ABOUT DIFFERENT THINGS: NEARLY EVERY DAY
5. BEING SO RESTLESS THAT IT IS HARD TO SIT STILL: SEVERAL DAYS

## 2024-03-15 NOTE — PROGRESS NOTES
Subjective  Kareen Brown, 24 y.o. female presents today with:  Chief Complaint   Patient presents with    Abdominal Pain    Constipation     Sx x 1 week         Needs annual exam also has a few concerns.    Abdominal Pain  This is a recurrent problem. The problem has been waxing and waning. Associated symptoms include constipation. Pertinent negatives include no diarrhea, headaches or nausea.   Constipation  Associated symptoms include abdominal pain. Pertinent negatives include no diarrhea, nausea or rectal pain.   Anxiety  Presents for initial visit. Onset was 1 to 6 months ago. The problem has been unchanged. Symptoms include compulsions, decreased concentration, depressed mood, excessive worry, insomnia, irritability, muscle tension and nervous/anxious behavior. Patient reports no chest pain, confusion, dizziness, dry mouth, feeling of choking, hyperventilation, impotence, malaise, nausea, obsessions, palpitations, panic, restlessness, shortness of breath or suicidal ideas. Symptoms occur most days. The severity of symptoms is mild. Nothing aggravates the symptoms. The quality of sleep is fair. Nighttime awakenings: several.     There are no known risk factors. Her past medical history is significant for anxiety/panic attacks. There is no history of anemia, arrhythmia, asthma, bipolar disorder, CAD, CHF, chronic lung disease, depression, fibromyalgia, hyperthyroidism or suicide attempts. Past treatments include nothing.         Review of Systems   Constitutional:  Positive for irritability. Negative for activity change, appetite change, fatigue and unexpected weight change.   HENT: Negative.  Negative for dental problem, nosebleeds, trouble swallowing and voice change.    Eyes: Negative.  Negative for visual disturbance.   Respiratory: Negative.  Negative for shortness of breath.    Cardiovascular: Negative.  Negative for chest pain, palpitations and leg swelling.   Gastrointestinal:  Positive for abdominal

## 2024-04-25 ASSESSMENT — PATIENT HEALTH QUESTIONNAIRE - PHQ9
SUM OF ALL RESPONSES TO PHQ QUESTIONS 1-9: 2
SUM OF ALL RESPONSES TO PHQ9 QUESTIONS 1 & 2: 2
SUM OF ALL RESPONSES TO PHQ9 QUESTIONS 1 & 2: 2
2. FEELING DOWN, DEPRESSED OR HOPELESS: SEVERAL DAYS
2. FEELING DOWN, DEPRESSED OR HOPELESS: SEVERAL DAYS
SUM OF ALL RESPONSES TO PHQ QUESTIONS 1-9: 2
1. LITTLE INTEREST OR PLEASURE IN DOING THINGS: SEVERAL DAYS
SUM OF ALL RESPONSES TO PHQ QUESTIONS 1-9: 2
SUM OF ALL RESPONSES TO PHQ QUESTIONS 1-9: 2
1. LITTLE INTEREST OR PLEASURE IN DOING THINGS: SEVERAL DAYS

## 2024-04-26 ENCOUNTER — OFFICE VISIT (OUTPATIENT)
Dept: FAMILY MEDICINE CLINIC | Age: 24
End: 2024-04-26
Payer: COMMERCIAL

## 2024-04-26 VITALS
HEIGHT: 66 IN | WEIGHT: 227 LBS | SYSTOLIC BLOOD PRESSURE: 120 MMHG | HEART RATE: 94 BPM | BODY MASS INDEX: 36.48 KG/M2 | DIASTOLIC BLOOD PRESSURE: 80 MMHG

## 2024-04-26 DIAGNOSIS — F41.1 GAD (GENERALIZED ANXIETY DISORDER): ICD-10-CM

## 2024-04-26 PROCEDURE — 99213 OFFICE O/P EST LOW 20 MIN: CPT | Performed by: NURSE PRACTITIONER

## 2024-04-26 PROCEDURE — G8427 DOCREV CUR MEDS BY ELIG CLIN: HCPCS | Performed by: NURSE PRACTITIONER

## 2024-04-26 PROCEDURE — G8417 CALC BMI ABV UP PARAM F/U: HCPCS | Performed by: NURSE PRACTITIONER

## 2024-04-26 PROCEDURE — 1036F TOBACCO NON-USER: CPT | Performed by: NURSE PRACTITIONER

## 2024-04-26 RX ORDER — FLUOXETINE HYDROCHLORIDE 20 MG/1
20 CAPSULE ORAL DAILY
Qty: 30 CAPSULE | Refills: 5 | Status: SHIPPED | OUTPATIENT
Start: 2024-04-26

## 2024-04-26 NOTE — PATIENT INSTRUCTIONS
To get HPV and Tetanus shots -       Call [phone] to schedule vaccine appointments.  All required and recommended vaccines for children and adults are available. Before your vaccine visit with [facility], locate and bring:   Vaccine record - Request a record of past vaccines (no cost)  Photo ID  Health insurance card (if you're insured)  Payment: We bill most private insurances, Medicaid and Medicare. Call your insurance company for details. Cash, check and VISA or MasterCard are also accepted.   No child is turned away from vaccination for inability to pay. Children are eligible for no-cost vaccines through the Vaccines for Children Program if they are younger than 19 years of age and are: on Medicaid, uninsured, underinsured,  or .   Limited no-cost vaccines are available for adults age 19 years or older who are uninsured or underinsured - call for details.

## 2024-04-30 ASSESSMENT — ENCOUNTER SYMPTOMS
ABDOMINAL PAIN: 0
BOWEL INCONTINENCE: 0
BACK PAIN: 1

## 2024-05-01 NOTE — PROGRESS NOTES
Subjective  Kareen Brown, 24 y.o. female presents today with:  Chief Complaint   Patient presents with   • Discuss Labs        Back Pain  This is a new problem. The current episode started in the past 7 days. The problem occurs constantly. The problem is unchanged. The pain is present in the lumbar spine. The quality of the pain is described as aching and shooting. The pain radiates to the right knee. The pain is at a severity of 5/10. The pain is moderate. The symptoms are aggravated by bending, coughing, position, lying down, sitting, standing, twisting and stress. Associated symptoms include leg pain. Pertinent negatives include no abdominal pain, bladder incontinence, bowel incontinence, chest pain, dysuria, fever, headaches, numbness, paresis, paresthesias, pelvic pain, perianal numbness, tingling, weakness or weight loss. She has tried nothing for the symptoms.         Review of Systems   Constitutional:  Negative for fever and weight loss.   Cardiovascular:  Negative for chest pain.   Gastrointestinal:  Negative for abdominal pain and bowel incontinence.   Genitourinary:  Negative for bladder incontinence, dysuria and pelvic pain.   Musculoskeletal:  Positive for back pain.   Neurological:  Negative for tingling, weakness, numbness, headaches and paresthesias.       No past medical history on file.  No past surgical history on file.  Social History     Socioeconomic History   • Marital status: Single     Spouse name: Not on file   • Number of children: Not on file   • Years of education: Not on file   • Highest education level: Not on file   Occupational History   • Not on file   Tobacco Use   • Smoking status: Never   • Smokeless tobacco: Never   Vaping Use   • Vaping Use: Never used   Substance and Sexual Activity   • Alcohol use: Never   • Drug use: Never   • Sexual activity: Not on file   Other Topics Concern   • Not on file   Social History Narrative   • Not on file     Social Determinants of Health

## 2024-10-17 DIAGNOSIS — F41.1 GAD (GENERALIZED ANXIETY DISORDER): ICD-10-CM

## 2024-10-17 NOTE — TELEPHONE ENCOUNTER
Please approve or deny request. Thank you!    Rx requested:  Requested Prescriptions     Pending Prescriptions Disp Refills    FLUoxetine (PROZAC) 20 MG capsule [Pharmacy Med Name: fluoxetine 20 mg capsule] 30 capsule 2     Sig: TAKE 1 CAPSULE BY MOUTH DAILY         Last Office Visit:   4/26/2024      Next Visit Date:  No future appointments.

## 2024-10-28 DIAGNOSIS — F41.1 GAD (GENERALIZED ANXIETY DISORDER): ICD-10-CM

## 2025-01-11 DIAGNOSIS — F41.1 GAD (GENERALIZED ANXIETY DISORDER): ICD-10-CM

## 2025-01-13 NOTE — TELEPHONE ENCOUNTER
Future Appointments    Encounter Information   Provider Department Appt Notes   1/24/2025 Mariella Lewis APRN - CNP [facility] Refill prescription and talk about a bump on my finger     Past Visits    Date Provider Specialty Visit Type Primary Dx   04/26/2024 Mariella Lewis APRN - CNP Family Medicine Office Visit SAVITA (generalized anxiety disorder)

## 2025-02-15 ENCOUNTER — APPOINTMENT (OUTPATIENT)
Dept: URGENT CARE | Age: 25
End: 2025-02-15

## 2025-07-13 DIAGNOSIS — F41.1 GAD (GENERALIZED ANXIETY DISORDER): ICD-10-CM

## 2025-07-15 ENCOUNTER — OFFICE VISIT (OUTPATIENT)
Age: 25
End: 2025-07-15
Payer: COMMERCIAL

## 2025-07-15 VITALS
BODY MASS INDEX: 37.61 KG/M2 | SYSTOLIC BLOOD PRESSURE: 124 MMHG | WEIGHT: 234 LBS | HEIGHT: 66 IN | DIASTOLIC BLOOD PRESSURE: 76 MMHG

## 2025-07-15 DIAGNOSIS — Z00.00 ANNUAL PHYSICAL EXAM: Primary | ICD-10-CM

## 2025-07-15 DIAGNOSIS — F41.1 GAD (GENERALIZED ANXIETY DISORDER): ICD-10-CM

## 2025-07-15 PROCEDURE — 99213 OFFICE O/P EST LOW 20 MIN: CPT | Performed by: NURSE PRACTITIONER

## 2025-07-15 PROCEDURE — 99395 PREV VISIT EST AGE 18-39: CPT | Performed by: NURSE PRACTITIONER

## 2025-07-15 PROCEDURE — G8427 DOCREV CUR MEDS BY ELIG CLIN: HCPCS | Performed by: NURSE PRACTITIONER

## 2025-07-15 PROCEDURE — 1036F TOBACCO NON-USER: CPT | Performed by: NURSE PRACTITIONER

## 2025-07-15 PROCEDURE — 99212 OFFICE O/P EST SF 10 MIN: CPT | Performed by: NURSE PRACTITIONER

## 2025-07-15 PROCEDURE — G8419 CALC BMI OUT NRM PARAM NOF/U: HCPCS | Performed by: NURSE PRACTITIONER

## 2025-07-15 SDOH — ECONOMIC STABILITY: INCOME INSECURITY: IN THE LAST 12 MONTHS, WAS THERE A TIME WHEN YOU WERE NOT ABLE TO PAY THE MORTGAGE OR RENT ON TIME?: NO

## 2025-07-15 SDOH — ECONOMIC STABILITY: FOOD INSECURITY: WITHIN THE PAST 12 MONTHS, THE FOOD YOU BOUGHT JUST DIDN'T LAST AND YOU DIDN'T HAVE MONEY TO GET MORE.: NEVER TRUE

## 2025-07-15 SDOH — ECONOMIC STABILITY: FOOD INSECURITY: WITHIN THE PAST 12 MONTHS, YOU WORRIED THAT YOUR FOOD WOULD RUN OUT BEFORE YOU GOT MONEY TO BUY MORE.: NEVER TRUE

## 2025-07-15 SDOH — ECONOMIC STABILITY: TRANSPORTATION INSECURITY
IN THE PAST 12 MONTHS, HAS THE LACK OF TRANSPORTATION KEPT YOU FROM MEDICAL APPOINTMENTS OR FROM GETTING MEDICATIONS?: NO

## 2025-07-15 SDOH — ECONOMIC STABILITY: TRANSPORTATION INSECURITY
IN THE PAST 12 MONTHS, HAS LACK OF TRANSPORTATION KEPT YOU FROM MEETINGS, WORK, OR FROM GETTING THINGS NEEDED FOR DAILY LIVING?: NO

## 2025-07-15 ASSESSMENT — PATIENT HEALTH QUESTIONNAIRE - PHQ9
SUM OF ALL RESPONSES TO PHQ QUESTIONS 1-9: 0
SUM OF ALL RESPONSES TO PHQ9 QUESTIONS 1 & 2: 0
1. LITTLE INTEREST OR PLEASURE IN DOING THINGS: NOT AT ALL
2. FEELING DOWN, DEPRESSED OR HOPELESS: NOT AT ALL
SUM OF ALL RESPONSES TO PHQ QUESTIONS 1-9: 0
1. LITTLE INTEREST OR PLEASURE IN DOING THINGS: NOT AT ALL
2. FEELING DOWN, DEPRESSED OR HOPELESS: NOT AT ALL

## 2025-07-22 ASSESSMENT — ENCOUNTER SYMPTOMS
TROUBLE SWALLOWING: 0
COLOR CHANGE: 0
RESPIRATORY NEGATIVE: 1
ALLERGIC/IMMUNOLOGIC NEGATIVE: 1
CONSTIPATION: 0
RECTAL PAIN: 0
VOICE CHANGE: 0
EYES NEGATIVE: 1
ANAL BLEEDING: 0
DIARRHEA: 0
SHORTNESS OF BREATH: 0
BLOOD IN STOOL: 0
GASTROINTESTINAL NEGATIVE: 1
ABDOMINAL PAIN: 0

## 2025-07-22 NOTE — PROGRESS NOTES
Hip joint spaces maintained.   SI joints maintained.   Pubic symphysis is intact.   Visualized lower lumbar spine unremarkable.   Soft tissues unremarkable.    No other significant abnormality.    Impression  No acute findings.    CAT Scan Result (most recent):  CT PELVIS WO CONTRAST 06/24/2022    Narrative  * * *Final Report* * *    DATE OF EXAM: Jun 24 2022  8:24AM    [facility] [place]   [id number]  -  CT PELVIS ORTHO WO IVCON  / ACCESSION #  [id number]    PROCEDURE REASON: Acetabular labrum tear, left, initial encounter    * * * * Physician Interpretation * * * *    EXAMINATION:  CT PELVIS ORTHO WO IVCON, CT 3D POST PROCESSING    CLINICAL HISTORY:Preoperative planning Acetabular labrum tear, left,  initial encounter    TECHNIQUE: Noncontrast spiral CT scanning of pelvis and bilateral knees  was performed in axial plane.  Coronal and sagittal reconstructions were  obtained from the original data set.  Three dimensional postprocessing  imaging was created on a dedicated stand-alone 3D workstation and  supervised and reviewed by the interpreting physician.    COMPARISON:  Left hip MRI dated 05/16/2022.  Radiodensity 01/06/2022.    CT Radiation dose: Integrated Dose-length product (DLP) for this visit =  611 mGy*cm.  CT Dose Reduction Employed: Automated exposure control (AEC)      RESULT:    Right Hip:    Right Alpha Angle: 48 degrees  Right Acetabular Version: 18 degrees  Right Femoral Version: 43 degrees    Left Hip:    Left Alpha Angle: 41 degrees  Left Acetabular Version: 3 degrees  Left  Femoral Version: 26 degrees      Other: No acute fracture or dislocation.  Bony irregularity along the  left anterior superior iliac spine likely from prior injury.  Joint  spaces are maintained.  No significant knee or hip joint effusions.    There is mild focal asymmetric fatty atrophy of the left gluteus medius  muscle.  Remaining muscle bulk is preserved.  Visualized intrapelvic  organs are